# Patient Record
Sex: MALE | Race: BLACK OR AFRICAN AMERICAN | NOT HISPANIC OR LATINO | Employment: OTHER | URBAN - METROPOLITAN AREA
[De-identification: names, ages, dates, MRNs, and addresses within clinical notes are randomized per-mention and may not be internally consistent; named-entity substitution may affect disease eponyms.]

---

## 2017-06-29 ENCOUNTER — TELEPHONE (OUTPATIENT)
Dept: FAMILY MEDICINE | Facility: CLINIC | Age: 67
End: 2017-06-29

## 2017-06-29 NOTE — TELEPHONE ENCOUNTER
----- Message from Lisa Steven sent at 6/29/2017  9:44 AM CDT -----  Contact: Pt's wife Jennyfer Ann  Pt's wife, Jennyfer, would like to be called back regarding an appt. for a two week f/u from 06/30.          Please call pt at [phone](home) or [phone](cell).        Thanks!

## 2017-06-29 NOTE — TELEPHONE ENCOUNTER
"Spoke with pt wife Jennyfer.  Pt is hospitalized at this time at [facility] since 6/20/17, expecting "maybe" to be d/c 6/29. States that his glucose has been over 500mg/dL, and he has been having HTN.  Instructed wife to talk to [facility] staff and inquire if hospital has a d/c clinic that follows pt for 30 days after d/c. Verbalized understanding, will call back when she has had a chance to talk to staff.  Provided wife with POD 6 fax number to have medical records sent to us as soon as possible. Verbalized understanding.       Has appt with Dr. Hudson 8/23/17 (scheduled in May to establish care). Pt has been placed on wait list for sooner appt.       "

## 2017-08-23 ENCOUNTER — TELEPHONE (OUTPATIENT)
Dept: FAMILY MEDICINE | Facility: CLINIC | Age: 67
End: 2017-08-23

## 2017-08-23 NOTE — TELEPHONE ENCOUNTER
----- Message from Julien Tellez sent at 8/23/2017  2:58 PM CDT -----  Contact: Wife, Jennyfer Burger want to speak with a nurse regarding scheduling patient appointment for today, Jennyfer said she never cancelled patient appointment please call back at [phone]

## 2017-08-23 NOTE — TELEPHONE ENCOUNTER
Patient didn't know if the insurance would cover and then the patient wasn't even out of the hospital by then , rescheduled the patient

## 2017-09-26 ENCOUNTER — TELEPHONE (OUTPATIENT)
Dept: FAMILY MEDICINE | Facility: CLINIC | Age: 67
End: 2017-09-26

## 2017-09-26 ENCOUNTER — OUTPATIENT CASE MANAGEMENT (OUTPATIENT)
Dept: ADMINISTRATIVE | Facility: OTHER | Age: 67
End: 2017-09-26

## 2017-09-26 ENCOUNTER — LAB VISIT (OUTPATIENT)
Dept: LAB | Facility: HOSPITAL | Age: 67
End: 2017-09-26
Attending: INTERNAL MEDICINE

## 2017-09-26 ENCOUNTER — OFFICE VISIT (OUTPATIENT)
Dept: FAMILY MEDICINE | Facility: CLINIC | Age: 67
End: 2017-09-26
Payer: MEDICARE

## 2017-09-26 ENCOUNTER — CLINICAL SUPPORT (OUTPATIENT)
Dept: AUDIOLOGY | Facility: CLINIC | Age: 67
End: 2017-09-26

## 2017-09-26 VITALS
RESPIRATION RATE: 19 BRPM | SYSTOLIC BLOOD PRESSURE: 128 MMHG | DIASTOLIC BLOOD PRESSURE: 82 MMHG | HEART RATE: 68 BPM | WEIGHT: 216.25 LBS | OXYGEN SATURATION: 97 %

## 2017-09-26 DIAGNOSIS — I63.9 CEREBROVASCULAR ACCIDENT (CVA), UNSPECIFIED MECHANISM: ICD-10-CM

## 2017-09-26 DIAGNOSIS — Z11.59 NEED FOR HEPATITIS C SCREENING TEST: ICD-10-CM

## 2017-09-26 DIAGNOSIS — Z12.11 SCREEN FOR COLON CANCER: ICD-10-CM

## 2017-09-26 DIAGNOSIS — E11.42 DM TYPE 2 WITH DIABETIC PERIPHERAL NEUROPATHY: ICD-10-CM

## 2017-09-26 DIAGNOSIS — H91.90 DECREASED HEARING, UNSPECIFIED LATERALITY: ICD-10-CM

## 2017-09-26 DIAGNOSIS — H90.6 MIXED HEARING LOSS, BILATERAL: Primary | ICD-10-CM

## 2017-09-26 DIAGNOSIS — I10 ESSENTIAL HYPERTENSION: ICD-10-CM

## 2017-09-26 DIAGNOSIS — R60.9 EDEMA, UNSPECIFIED TYPE: ICD-10-CM

## 2017-09-26 DIAGNOSIS — E78.5 DYSLIPIDEMIA: ICD-10-CM

## 2017-09-26 LAB
ALBUMIN SERPL BCP-MCNC: 3.9 G/DL
ALP SERPL-CCNC: 73 U/L
ALT SERPL W/O P-5'-P-CCNC: 22 U/L
ANION GAP SERPL CALC-SCNC: 9 MMOL/L
AST SERPL-CCNC: 29 U/L
BILIRUB SERPL-MCNC: 0.9 MG/DL
BUN SERPL-MCNC: 13 MG/DL
CALCIUM SERPL-MCNC: 9.8 MG/DL
CHLORIDE SERPL-SCNC: 101 MMOL/L
CHOLEST SERPL-MCNC: 151 MG/DL
CHOLEST/HDLC SERPL: 4.7 {RATIO}
CO2 SERPL-SCNC: 29 MMOL/L
CREAT SERPL-MCNC: 1.2 MG/DL
EST. GFR  (AFRICAN AMERICAN): >60 ML/MIN/1.73 M^2
EST. GFR  (NON AFRICAN AMERICAN): >60 ML/MIN/1.73 M^2
ESTIMATED AVG GLUCOSE: 183 MG/DL
GLUCOSE SERPL-MCNC: 106 MG/DL
HBA1C MFR BLD HPLC: 8 %
HDLC SERPL-MCNC: 32 MG/DL
HDLC SERPL: 21.2 %
LDLC SERPL CALC-MCNC: 87.2 MG/DL
NONHDLC SERPL-MCNC: 119 MG/DL
POTASSIUM SERPL-SCNC: 4.3 MMOL/L
PROT SERPL-MCNC: 8.2 G/DL
SODIUM SERPL-SCNC: 139 MMOL/L
TRIGL SERPL-MCNC: 159 MG/DL

## 2017-09-26 PROCEDURE — 99204 OFFICE O/P NEW MOD 45 MIN: CPT | Mod: S$GLB,,, | Performed by: INTERNAL MEDICINE

## 2017-09-26 PROCEDURE — 92557 COMPREHENSIVE HEARING TEST: CPT | Mod: PBBFAC,PO | Performed by: AUDIOLOGIST-HEARING AID FITTER

## 2017-09-26 PROCEDURE — 80061 LIPID PANEL: CPT

## 2017-09-26 PROCEDURE — 1159F MED LIST DOCD IN RCRD: CPT | Mod: S$GLB,,, | Performed by: INTERNAL MEDICINE

## 2017-09-26 PROCEDURE — 86803 HEPATITIS C AB TEST: CPT

## 2017-09-26 PROCEDURE — 80053 COMPREHEN METABOLIC PANEL: CPT

## 2017-09-26 PROCEDURE — 1126F AMNT PAIN NOTED NONE PRSNT: CPT | Mod: S$GLB,,, | Performed by: INTERNAL MEDICINE

## 2017-09-26 PROCEDURE — 92567 TYMPANOMETRY: CPT | Mod: PBBFAC,PO | Performed by: AUDIOLOGIST-HEARING AID FITTER

## 2017-09-26 PROCEDURE — 83036 HEMOGLOBIN GLYCOSYLATED A1C: CPT

## 2017-09-26 PROCEDURE — 36415 COLL VENOUS BLD VENIPUNCTURE: CPT | Mod: PO

## 2017-09-26 PROCEDURE — 99999 PR PBB SHADOW E&M-EST. PATIENT-LVL IV: CPT | Mod: PBBFAC,,, | Performed by: INTERNAL MEDICINE

## 2017-09-26 RX ORDER — CLOPIDOGREL BISULFATE 75 MG/1
75 TABLET ORAL DAILY
COMMUNITY
Start: 2016-09-07 | End: 2017-10-06 | Stop reason: SDUPTHER

## 2017-09-26 RX ORDER — LISINOPRIL 20 MG/1
1 TABLET ORAL DAILY
COMMUNITY
Start: 2017-07-24 | End: 2017-10-06 | Stop reason: SDUPTHER

## 2017-09-26 RX ORDER — LANCETS
1 EACH MISCELLANEOUS
COMMUNITY
Start: 2016-11-02 | End: 2018-10-12 | Stop reason: SDUPTHER

## 2017-09-26 RX ORDER — AMLODIPINE BESYLATE 10 MG/1
10 TABLET ORAL DAILY
COMMUNITY
End: 2017-10-06 | Stop reason: SDUPTHER

## 2017-09-26 RX ORDER — ATORVASTATIN CALCIUM 10 MG/1
10 TABLET, FILM COATED ORAL DAILY
COMMUNITY
End: 2018-10-12 | Stop reason: SDUPTHER

## 2017-09-26 RX ORDER — INSULIN LISPRO 100 [IU]/ML
15 INJECTION, SOLUTION INTRAVENOUS; SUBCUTANEOUS
COMMUNITY
Start: 2017-06-29 | End: 2017-09-26

## 2017-09-26 NOTE — Clinical Note
Thank you for your referral to audiology. Results from today's mya can be found in his chart. 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 His left ear results indicate a profound hearing loss. Scheduled a follow up with ENT on 10/4/17 to discuss medical management of the TM perf and the air/bone gap. Recommended right ear, CROS or BICROS hearing aids for which he was given information to call the audiology clinic if he wishes to discuss hearing aids.

## 2017-09-26 NOTE — PROGRESS NOTES
Thank you for the referral.  Patient has been assigned to IRMA [facility],  for low risk screening for Outpatient Case Management.     Reason for referral: Low risk    Uncontrolled type 2 diabetes mellitus without complication, without long-term current use of insulin  DM type 2 with diabetic peripheral neuropathy  Essential hypertension  Dyslipidemia  Decreased hearing, unspecified laterality  Cerebrovascular accident (CVA), unspecified mechanism    Thank you,    Fabi Kumar, SSC

## 2017-09-26 NOTE — PROGRESS NOTES
Figueroa Ann was seen 09/26/2017 for an audiological evaluation. Patient complains of hearing loss. Pt reports he has had trouble with hearing for some time. He has uncontrolled diabetes type 2. He denies tinnitus and family Hx of hearing loss. 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     Results reveal a mild-to-profound mixed hearing loss for the right ear, and  severe-to-profound to profound mixed hearing loss for the left ear.    Speech Reception Thresholds were  30 dBHL for the right ear and 100 dBHL for the left ear.    Word recognition scores were fair for the right ear and CNT the left ear due to inability to test at 40 dB SPL.   Tympanograms were Type B large volume for the right ear and Type A for the left ear.    Audiogram results were reviewed in detail with patient and all questions were answered. Results will be reviewed by ENT at the completion of this note. Recommend further medical eval for the air/bone gap and asymmetrical hearing loss, right ear, CROS or BICROSamplification pending medical clearance, annual hearing tests to monitor hearing and hearing protection in loud noise. Scheduled pt with ENT, Dr. Christopher Moura for medical intervention on 10/4/17. Pt was also give information to call the clinic if he desires to consult about hearing aids.

## 2017-09-26 NOTE — PROGRESS NOTES
Subjective:       Patient ID: Figueroa Ann is a 67 y.o. male.    Chief Complaint: Establish Care    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  On Plavix.   DM - eye Dr in [place] due for eye exam.  Admitted June [place] for high blood sugar.  DC to rehab after.  A1c 13 June in [facility] Everywhere/[place].   HTN - controlled  HLD - on statin; gaol < 70 CVA  Complains of b/l LE swelling x 3 weeks.         Review of Systems   Constitutional: Negative for appetite change and fever.   HENT: Negative for nosebleeds and trouble swallowing.    Eyes: Negative for discharge and visual disturbance.   Respiratory: Negative for choking and shortness of breath.    Cardiovascular: Negative for chest pain and palpitations.   Gastrointestinal: Negative for abdominal pain, nausea and vomiting.   Musculoskeletal: Negative for arthralgias and joint swelling.   Skin: Negative for rash and wound.   Neurological: Negative for dizziness and syncope.   Psychiatric/Behavioral: Negative for confusion and dysphoric mood.       Objective:      Vitals:    09/26/17 0940   BP: 128/82   Pulse: 68   Resp: 19     Physical Exam   Constitutional: He appears well-nourished.   Eyes: Conjunctivae and EOM are normal.   Neck: Trachea normal and normal range of motion. No thyromegaly present.   Cardiovascular: Normal heart sounds.    Pulses:       Dorsalis pedis pulses are 1+ on the right side, and 1+ on the left side.   Edema negative   Pulmonary/Chest: Effort normal and breath sounds normal.   Abdominal: Soft. There is no hepatomegaly.   Feet:   Right Foot:   Protective Sensation: 4 sites tested. 0 sites sensed.   Left Foot:   Protective Sensation: 4 sites tested. 0 sites sensed.   Neurological: No cranial nerve deficit.   DTR decreased bilateral   Skin: Skin is warm, dry and intact.   Psychiatric: He has a normal mood and affect.   Alert and Oriented    Vitals reviewed.        Assessment:       1. Uncontrolled type 2 diabetes mellitus  without complication, without long-term current use of insulin    2. DM type 2 with diabetic peripheral neuropathy    3. Essential hypertension    4. Dyslipidemia    5. Decreased hearing, unspecified laterality    6. Cerebrovascular accident (CVA), unspecified mechanism    7. Need for hepatitis C screening test    8. Screen for colon cancer    9. Edema, unspecified type        Plan:       Uncontrolled type 2 diabetes mellitus without complication, without long-term current use of insulin  -     Microalbumin/creatinine urine ratio; Future; Expected date: 09/26/2017  -     Hemoglobin A1c; Future; Expected date: 09/26/2017  -     Hemoglobin A1c; Future; Expected date: 03/25/2018  -     Ambulatory referral to Outpatient Case Management  -     Ambulatory Referral to Diabetes Education    DM type 2 with diabetic peripheral neuropathy  -     Ambulatory consult to Podiatry  -     Ambulatory referral to Neurology  -     Ambulatory referral to Outpatient Case Management    Essential hypertension  -     Comprehensive metabolic panel; Future; Expected date: 03/25/2018  -     Comprehensive metabolic panel; Future; Expected date: 09/26/2017  -     Ambulatory referral to Outpatient Case Management    Dyslipidemia  -     Lipid panel; Future; Expected date: 09/26/2017  -     Lipid panel; Future; Expected date: 03/25/2018  -     Ambulatory referral to Outpatient Case Management    Decreased hearing, unspecified laterality  -     Ambulatory referral to Outpatient Case Management  -     Ambulatory consult to Audiology    Cerebrovascular accident (CVA), unspecified mechanism  -     CBC auto differential; Future; Expected date: 03/25/2018  -     Ambulatory referral to Neurology  -     Ambulatory referral to Outpatient Case Management    Need for hepatitis C screening test  -     Hepatitis C antibody; Future; Expected date: 09/26/2017    Screen for colon cancer  -     Case request GI: COLONOSCOPY    Edema, unspecified type  -     2D echo  "with color flow doppler; Future; Expected date: 09/26/2017  -      Lower Extremity Veins Bilateral; Future; Expected date: 09/26/2017            Counseled on regular exercise, maintenance of a healthy weight, balanced diet rich in fruits/vegetables and lean protein, and avoidance of unhealthy habits like smoking and excessive alcohol intake.   Also, counseled on importance of being compliant with medication, health appointments, diet and exercise.     Return in about 6 months (around 3/26/2018).    "This note will not be shared with the patient."  "

## 2017-09-26 NOTE — TELEPHONE ENCOUNTER
----- Message from Fabi Kumar sent at 9/26/2017 10:30 AM CDT -----  Thank you for the referral.  Patient has been assigned to IR[facility],  for low risk screening for Outpatient Case Management.      Reason for referral: Low risk     Uncontrolled type 2 diabetes mellitus without complication, without long-term current use of insulin  DM type 2 with diabetic peripheral neuropathy  Essential hypertension  Dyslipidemia  Decreased hearing, unspecified laterality  Cerebrovascular accident (CVA), unspecified mechanism     Thank you,     Fabi Kumar, SSC

## 2017-09-27 ENCOUNTER — OUTPATIENT CASE MANAGEMENT (OUTPATIENT)
Dept: ADMINISTRATIVE | Facility: OTHER | Age: 67
End: 2017-09-27

## 2017-09-27 LAB — HCV AB SERPL QL IA: NEGATIVE

## 2017-09-28 ENCOUNTER — OUTPATIENT CASE MANAGEMENT (OUTPATIENT)
Dept: ADMINISTRATIVE | Facility: OTHER | Age: 67
End: 2017-09-28

## 2017-09-28 ENCOUNTER — TELEPHONE (OUTPATIENT)
Dept: FAMILY MEDICINE | Facility: CLINIC | Age: 67
End: 2017-09-28

## 2017-09-28 NOTE — TELEPHONE ENCOUNTER
----- Message from IRMA Grubbs sent at 9/28/2017 11:48 AM CDT -----  This CSW received a referral on the above patient. I have attempted to contact the patient or caregiver by phone two times unsuccessfully and mailed a letter with our contact information.    Thank you for the referral,    IRMA Harris

## 2017-09-28 NOTE — PROGRESS NOTES
This CSW attempted to reach patient to provide resource . CSW unable to leave a message . Mail box has not been set up.  Referral source notified.  Letter with contact information was sent via US Mail to patient.

## 2017-09-28 NOTE — LETTER
September 28, 2017    Figueroa Ann  [address] [address]             [facility] [address] [place] [address] Dear:Mr. Figueroa Ann    I am writing from the Outpatient Complex Care Management Department at [facility].  I received a referral from Dr. Joel Hudson to contact you or your caregiver regarding any needs you may have. I have attempted to contact you or your cargeiver by phone two times unsuccessfully.  Please contact the Outpatient Complex Care Management Department at [phone] if you would like to discuss your needs.      Sincerely,         IRMA Harris

## 2017-10-02 ENCOUNTER — DOCUMENTATION ONLY (OUTPATIENT)
Dept: FAMILY MEDICINE | Facility: CLINIC | Age: 67
End: 2017-10-02

## 2017-10-04 ENCOUNTER — OFFICE VISIT (OUTPATIENT)
Dept: PODIATRY | Facility: CLINIC | Age: 67
End: 2017-10-04
Payer: MEDICARE

## 2017-10-04 ENCOUNTER — OFFICE VISIT (OUTPATIENT)
Dept: OTOLARYNGOLOGY | Facility: CLINIC | Age: 67
End: 2017-10-04
Payer: MEDICARE

## 2017-10-04 VITALS
SYSTOLIC BLOOD PRESSURE: 136 MMHG | HEIGHT: 67 IN | WEIGHT: 215.81 LBS | HEIGHT: 67 IN | BODY MASS INDEX: 33.84 KG/M2 | DIASTOLIC BLOOD PRESSURE: 82 MMHG | WEIGHT: 215.63 LBS | BODY MASS INDEX: 33.87 KG/M2

## 2017-10-04 DIAGNOSIS — H91.92 PROFOUND HEARING LOSS OF LEFT EAR: ICD-10-CM

## 2017-10-04 DIAGNOSIS — E11.9 ENCOUNTER FOR COMPREHENSIVE DIABETIC FOOT EXAMINATION, TYPE 2 DIABETES MELLITUS: Primary | ICD-10-CM

## 2017-10-04 DIAGNOSIS — R60.0 BILATERAL LOWER EXTREMITY EDEMA: ICD-10-CM

## 2017-10-04 DIAGNOSIS — H72.91 PERFORATION OF RIGHT TYMPANIC MEMBRANE: ICD-10-CM

## 2017-10-04 DIAGNOSIS — H90.A31 MIXED CONDUCTIVE AND SENSORINEURAL HEARING LOSS OF RIGHT EAR WITH RESTRICTED HEARING OF LEFT EAR: ICD-10-CM

## 2017-10-04 DIAGNOSIS — B35.1 ONYCHOMYCOSIS DUE TO DERMATOPHYTE: ICD-10-CM

## 2017-10-04 PROCEDURE — 99203 OFFICE O/P NEW LOW 30 MIN: CPT | Mod: S$GLB,,, | Performed by: OTOLARYNGOLOGY

## 2017-10-04 PROCEDURE — 99999 PR PBB SHADOW E&M-EST. PATIENT-LVL II: CPT | Mod: PBBFAC,,, | Performed by: OTOLARYNGOLOGY

## 2017-10-04 PROCEDURE — 99202 OFFICE O/P NEW SF 15 MIN: CPT | Mod: S$GLB,,, | Performed by: PODIATRIST

## 2017-10-04 PROCEDURE — 99999 PR PBB SHADOW E&M-EST. PATIENT-LVL II: CPT | Mod: PBBFAC,,, | Performed by: PODIATRIST

## 2017-10-04 NOTE — PROGRESS NOTES
"Subjective:      Patient ID: Figueroa Ann is a 67 y.o. male.    Chief Complaint: Diabetic Foot Exam (PCP:  Dr Hudson  9/26/17; HgbA1c: 9/26/17  8.0) and Foot Problem ("No feeling in feet")    Figueroa is a 67 y.o. male who presents to the clinic upon referral from Dr. Hudson  for evaluation and treatment of diabetic feet. Figueroa has a past medical history of Diabetes mellitus, type 2; DM (diabetes mellitus); HLD (hyperlipidemia); HTN (hypertension); and Stroke. Patient relates no major problem with feet. Only complaints today consist of long thick fungal nails difficult to cut. Reports that he was unable to feel the monofilament when with his PCP.    PCP: Joel Hudson MD    Date Last Seen by PCP: 9/26/17    Current shoe gear: Tennis shoes    Hemoglobin A1C   Date Value Ref Range Status   09/26/2017 8.0 (H) 4.0 - 5.6 % Final     Comment:     According to ADA guidelines, hemoglobin A1c <7.0% represents  optimal control in non-pregnant diabetic patients. Different  metrics may apply to specific patient populations.   Standards of Medical Care in Diabetes-2016.  For the purpose of screening for the presence of diabetes:  <5.7%     Consistent with the absence of diabetes  5.7-6.4%  Consistent with increasing risk for diabetes   (prediabetes)  >or=6.5%  Consistent with diabetes  Currently, no consensus exists for use of hemoglobin A1c  for diagnosis of diabetes for children.  This Hemoglobin A1c assay has significant interference with fetal   hemoglobin   (HbF). The results are invalid for patients with abnormal amounts of   HbF,   including those with known Hereditary Persistence   of Fetal Hemoglobin. Heterozygous hemoglobin variants (HbAS, HbAC,   HbAD, HbAE, HbA2) do not significantly interfere with this assay;   however, presence of multiple variants in a sample may impact the %   interference.             Review of Systems   Constitution: Negative for chills, fever, weakness and malaise/fatigue.   Cardiovascular: Negative " for claudication and leg swelling.   Respiratory: Negative for shortness of breath.    Skin: Positive for nail changes. Negative for itching, poor wound healing and rash.   Musculoskeletal: Positive for joint pain. Negative for gout, muscle cramps, muscle weakness and myalgias.   Gastrointestinal: Negative for nausea and vomiting.   Neurological: Negative for focal weakness, loss of balance, numbness and paresthesias.           Objective:      Physical Exam   Constitutional: He is oriented to person, place, and time. He appears well-developed and well-nourished. No distress.   Cardiovascular:   Pulses:       Dorsalis pedis pulses are 2+ on the right side, and 2+ on the left side.        Posterior tibial pulses are 2+ on the right side, and 2+ on the left side.   < 3 sec capillary refill time to toes 1-5 bilateral. Toes and feet are warm to touch proximally with normal distal cooling b/l. There is no hair growth on the feet and toes b/l. There is 2+ pitting edema b/l. No obvious spider veins or varicosities present b/l.      Musculoskeletal:   Equinus noted b/l ankles with < 10 deg DF noted. MMT 5/5 in DF/PF/Inv/Ev resistance with no reproduction of pain in any direction. Passive range of motion of ankle and pedal joints is painless b/l.     Feet:   Right Foot:   Protective Sensation: 10 sites tested. 10 sites sensed.   Left Foot:   Protective Sensation: 10 sites tested. 10 sites sensed.   Neurological: He is alert and oriented to person, place, and time. He has normal strength. He displays no atrophy and no tremor. No sensory deficit. He exhibits normal muscle tone.   Negative tinel sign bilateral. Vibratory, sharp dull, and soft touch sensations intact bilateral.   Skin: Skin is warm, dry and intact. No abrasion, no bruising, no burn, no ecchymosis, no laceration, no lesion, no petechiae and no rash noted. He is not diaphoretic. No cyanosis or erythema. No pallor. Nails show no clubbing.   Skin temperature within  normal limits. Arie was thin and atrophic.     Nails 1-5 bilateral are thick 3-4 mm, long 3-6 mm, and discolored with subungual debris     Psychiatric: He has a normal mood and affect. His behavior is normal.             Assessment:       Encounter Diagnoses   Name Primary?    Encounter for comprehensive diabetic foot examination, type 2 diabetes mellitus Yes    Onychomycosis due to dermatophyte     Bilateral lower extremity edema          Plan:       Figueroa was seen today for diabetic foot exam and foot problem.    Diagnoses and all orders for this visit:    Encounter for comprehensive diabetic foot examination, type 2 diabetes mellitus    Onychomycosis due to dermatophyte    Bilateral lower extremity edema      I counseled the patient on his conditions, their implications and medical management.    Shoe inspection. Diabetic Foot Education. Patient reminded of the importance of good nutrition and blood sugar control to help prevent podiatric complications of diabetes. Patient instructed on proper foot hygeine. We discussed wearing proper shoe gear, daily foot inspections, never walking without protective shoe gear, never putting sharp instruments to feet    Discussed treatment options for fungal toenails to include topical vs oral vs laser vs combined therapy in detail however with diabetes there is a low chance of cure and high incidence of recurrence often times not worth treating, recommend debridement    He does not qualify for routine nail care, discussed proc B options. Patient defers at this time and would like to cut his own nails.    Recommend OTC compression stockings for edema, is following up with vascular testing with his PCP, will defer to PCP for edema management.    Return 1 year for diabetic foot check.    Cam Trivedi DPM

## 2017-10-04 NOTE — PROGRESS NOTES
Subjective:       Patient ID: Figueroa Ann is a 67 y.o. male.    Chief Complaint: perforated ear drum    Figueroa is here for hearing loss. Symptoms have been present for many years. No vertigo or otorrhea. Worse on the left. He denies pain. No family history of hearing loss. Denies hearing loss at a younger age. Denies trauma. He has a history of DM and hypertension. Has not worn hearing aids in the past    He reports an MRI of his Brain recently at [facility].    Previous surgery: None head or neck    History   Smoking Status    Current Some Day Smoker    Types: Cigarettes   Smokeless Tobacco    Never Used     History   Alcohol Use No          Review of Systems   Constitutional: Negative for activity change and appetite change.   Eyes: Negative for discharge.   Respiratory: Negative for difficulty breathing and wheezing   Cardiovascular: Negative for chest pain.   Gastrointestinal: Negative for abdominal distention and abdominal pain.   Endocrine: Negative for cold intolerance and heat intolerance.   Genitourinary: Negative for dysuria.   Musculoskeletal: Negative for gait problem and joint swelling.   Skin: Negative for color change and pallor.   Neurological: Negative for syncope and weakness.   Psychiatric/Behavioral: Negative for agitation and confusion.     Objective:        Constitutional:   He is oriented to person, place, and time. He appears well-developed and well-nourished. He appears alert. He is active. Normal speech.      Head:  Normocephalic and atraumatic. Head is without TMJ tenderness. No scars. Salivary glands normal.  Facial strength is normal.      Ears:    Right Ear: No drainage or swelling. Tympanic membrane is perforated (10% posterior perforation near malleus manubrium) and erythematous. No middle ear effusion. Decreased hearing is noted.   Left Ear: No drainage or swelling. Tympanic membrane is scarred (extensive tympanosclerosis).  No middle ear effusion. Decreased hearing is noted.    Dried cerumen on and adjacent to TM on right    Nose:  No mucosal edema, rhinorrhea or sinus tenderness. No turbinate hypertrophy.      Mouth/Throat  Oropharynx clear and moist without lesions or asymmetry, normal uvula midline and mirror exam normal. Normal dentition. No uvula swelling, lacerations or trismus. No oropharyngeal exudate. Tonsillar erythema, tonsillar exudate.      Neck:  Full range of motion with neck supple and no adenopathy. Thyroid tenderness is present. No tracheal deviation, no edema, no erythema, normal range of motion, no stridor, no crepitus and no neck rigidity present. No thyroid mass present.     Cardiovascular:   Intact distal pulses and normal pulses.      Pulmonary/Chest:   Effort normal and breath sounds normal. No stridor.     Psychiatric:   His speech is normal and behavior is normal. His mood appears not anxious. His affect is not labile.     Neurological:   He is alert and oriented to person, place, and time. No sensory deficit.     Skin:   No abrasions, lacerations, lesions, or rashes. No abrasion and no bruising noted.         Tests / Results:  I personally reviewed the audiogram from Sept 2017 and my findings reveal: severe to profound mixed HL AS with moderate to severe/profound mixed HL AD . WRS AD 76%             Assessment:       1. Perforation of right tympanic membrane    2. Profound hearing loss of left ear    3. Mixed conductive and sensorineural hearing loss of right ear with restricted hearing of left ear          Plan:         Mr Ann has bilateral hearing loss, currently with a non-serviceable left ear. I suspect he may have advanced cochlear otosclerosis based on his mixed HL in the severe to profound range. He denies any previous ear history. He does have a right sided tympanic membrane perforation which is possibly causing a conductive loss on that side, although it could also be ossicular pathology. His ear is dry and healthy on the right. It is amenable to  at least a hearing aid and more optimally a BiCROS.     I discussed tympanoplasty on the right as a possible option, but this would likely not improve his hearing enough to not need an aid on that side. It is also his only hearing ear. As a result, I intiially would recommend BiCROS or right aid evaluation. If he does not achieve benefit with aid, we could consider surgery at that time.     Follow-up with me as needed.

## 2017-10-04 NOTE — LETTER
October 4, 2017      Joel Hudson MD  [address] [facility] [address] [place] [address]           [facility]  [address] [facility] [address] [place] [address]  Phone: [phone]          Patient: Figueroa nAn   MR Number: [id number]   YOB: 1950   Date of Visit: 10/4/2017       Dear Dr. Joel Hudson:    Thank you for referring Figueroa Ann to me for evaluation. Attached you will find relevant portions of my assessment and plan of care.    If you have questions, please do not hesitate to call me. I look forward to following Figueroa Ann along with you.    Sincerely,    Cam Trivedi, DPWALT    Enclosure  CC:  No Recipients    If you would like to receive this communication electronically, please contact [email or web address] or [phone] to request more information on [facility] access.    For providers and/or their staff who would like to refer a patient to [facility], please contact us through our one-stop-shop provider referral line, Arminda Pena, at [phone].    If you feel you have received this communication in error or would no longer like to receive these types of communications, please e-mail [email or web address]

## 2017-10-06 ENCOUNTER — TELEPHONE (OUTPATIENT)
Dept: FAMILY MEDICINE | Facility: CLINIC | Age: 67
End: 2017-10-06

## 2017-10-06 RX ORDER — AMLODIPINE BESYLATE 10 MG/1
10 TABLET ORAL DAILY
Qty: 90 TABLET | Refills: 3 | Status: SHIPPED | OUTPATIENT
Start: 2017-10-06 | End: 2018-10-12 | Stop reason: SDUPTHER

## 2017-10-06 RX ORDER — LISINOPRIL 20 MG/1
20 TABLET ORAL DAILY
Qty: 90 TABLET | Refills: 3 | Status: SHIPPED | OUTPATIENT
Start: 2017-10-06 | End: 2018-10-12 | Stop reason: SDUPTHER

## 2017-10-06 RX ORDER — CLOPIDOGREL BISULFATE 75 MG/1
75 TABLET ORAL DAILY
Qty: 90 TABLET | Refills: 3 | Status: SHIPPED | OUTPATIENT
Start: 2017-10-06 | End: 2019-01-04 | Stop reason: SDUPTHER

## 2017-10-06 NOTE — TELEPHONE ENCOUNTER
Pravastatin not prescribed by this clinic.  If pt called back please ask who was the prescriber on the bottle or for them to call their pharmacy to request it from past prescriber. Thanks

## 2017-10-06 NOTE — PROGRESS NOTES
Refill Authorization Note     is requesting a refill authorization.    Brief assessment and rational for refill: APPROVE: prr  Name of medication: amlodipine 10 mg / lisinopril 20 mg / clopidogrel 75 mg   How patient will take medication: t1t po daily   Amount/Quantity of medication ordered: 90d   Medication reconciliation completed: No        Refills Authorized: Yes  If authorized number of refills: 3        Medication Therapy Plan: BP controlled.  last kidney labs WNL.  clopidogrel for hx of CVA.  Approve all for 12 mo.  Consider ordering CBC to monitor H/H with plavix.  No recent labwork or scanned in media found.   [employer] ([facility]) system is not working da; cannot search for pravastatin in this instance.  Comments:   Lab Results   Component Value Date    CREATININE 1.2 09/26/2017    BUN 13 09/26/2017     09/26/2017    K 4.3 09/26/2017     09/26/2017    CO2 29 09/26/2017      BP Readings from Last 3 Encounters:   10/04/17 136/82   09/26/17 128/82

## 2017-10-06 NOTE — TELEPHONE ENCOUNTER
Pharmacy also asking for refill on pravastatin but I don't see it in his med list or past med list.    L.O.V 9/26/17    F/u 3/26/18

## 2017-10-10 ENCOUNTER — OUTPATIENT CASE MANAGEMENT (OUTPATIENT)
Dept: ADMINISTRATIVE | Facility: OTHER | Age: 67
End: 2017-10-10

## 2017-10-10 NOTE — PROGRESS NOTES
CSW received a return call from patient spouse on recorder. CSW attempted to contact patient and spouse no answer. CSW unable to leave message.. Patient has a voicemail box that has not been set up.

## 2017-10-11 ENCOUNTER — CLINICAL SUPPORT (OUTPATIENT)
Dept: AUDIOLOGY | Facility: CLINIC | Age: 67
End: 2017-10-11
Payer: MEDICARE

## 2017-10-11 ENCOUNTER — TELEPHONE (OUTPATIENT)
Dept: OTOLARYNGOLOGY | Facility: CLINIC | Age: 67
End: 2017-10-11

## 2017-10-11 ENCOUNTER — PATIENT OUTREACH (OUTPATIENT)
Dept: ADMINISTRATIVE | Facility: HOSPITAL | Age: 67
End: 2017-10-11

## 2017-10-11 DIAGNOSIS — Z71.89 ENCOUNTER FOR HEARING AID CONSULTATION: Primary | ICD-10-CM

## 2017-10-11 NOTE — TELEPHONE ENCOUNTER
Reviewed notes: MRI Diffusion imaging showed no stroke. Not great for CPA lesion.    CT Head more recently negative for widening of IAC or concerning pathology.     Medically cleared for hearing aids. Would defer MRI due to medical co-morbidities and age at this time at hearing is not serviceable. Cleared for aids.

## 2017-10-11 NOTE — LETTER
October 11, 2017    Figueroa Ann  [address] [place] [id number]             [facility]  [address]  [facility] [address]  Phone: [phone] Dear Mr. Ann:    [facility] is committed to your overall health and would like to ensure that you are up to date on all your health maintenance testing.  Our records indicate that you are overdue for your  ANNUAL DILATED EYE EXAM.    If you have had this exam done at another facility, please let us know so we can update your record.  If you have a copy of these records, please provide a copy for us to scan into your chart, you are welcome to request that the report is faxed to the our fax ([phone]).  If not, please provide that provider/facilities contact information so that we may obtain copies from that facility.   If you have an upcoming eye exam appointment at another facility, please provide them our fax number so that they may fax the report to us.      Otherwise, please schedule this exam at your earliest convenience.    Also, you may be due for Immunizations and a Colonoscopy (Colorectal screening).       If you have any questions or concerns, please don't hesitate to call.    Sincerely,    Marie Olsen  Clinical Care Coordinator  [facility] [address] [place] [address]  Phone: [phone]   Fax: [phone]

## 2017-10-11 NOTE — PROGRESS NOTES
Figueroa Ann was seen on 10/11/2017 for a hearing aid consult with his wife. Patient's audiogram and hearing aid options were discussed in detail. Explained that the BICROS option would allow him to hear conversation and noiseDemoed Phonak BICROS aids and also the single right hearing aid. Pt said he heard much better with the BICROS option rather than the single right aid. We discussed the different sizes and levels of technology and decided based on the patients lifestyle that the best choice would be Phonak Audeo B?-13 in color P3 with size 1xP  AD and size 1 thin tube for the BICROS. The price was quoted for each tech level and both options (BICROS and single right aid). Pt was informed that the hearing test would be valid for 6 months for the purchase of hearing aids and that they would need to pay for the hearing aids in full and be reimbursed by their insurance company for any hearing aid benefits they may have. They were going to look into Care Credit and call [employer] to determine any hearing aid benefits. Pt will call if he decides to purchase the aids.

## 2018-03-02 DIAGNOSIS — Z13.5 DIABETIC RETINOPATHY SCREENING: ICD-10-CM

## 2018-03-12 ENCOUNTER — PATIENT OUTREACH (OUTPATIENT)
Dept: ADMINISTRATIVE | Facility: HOSPITAL | Age: 68
End: 2018-03-12

## 2018-03-12 NOTE — PROGRESS NOTES
Health Maintenance Due   Topic Date Due    Eye Exam  04/12/1958    TETANUS VACCINE  04/12/1966    Colonoscopy  04/12/1998    Zoster Vaccine  04/12/2008    Pneumococcal (65+) (1 of 2 - PCV13) 04/12/2013    Abdominal Aortic Aneurysm Screening  04/12/2013    Influenza Vaccine  08/01/2017    Hemoglobin A1c  12/26/2017     Pre-visit outreach via mail

## 2018-03-12 NOTE — LETTER
March 12, 2018    Figueroa Ann  [address] [place] [id number]             [facility]  [address]  [facility] [address]  Phone: [phone] Dear Mr. Ann:    [facility] is committed to your overall health.  To help you get the most out of each of your visits, we will review your information to make sure you are up to date on all of your recommended tests and/or procedures.      Dr. Hudson        has found that you may be due for:    Tetanus immunization  Colonoscopy (Colorectal screening)  Shingles immunization  Pneumonia immunization  Influenza vaccine  Diabetic Retinopathy EYE EXAM screening (we now are able to perform this test the same day as your office visit).  You can have this test right after your appointment with Dr. Hudson on 3/26/18  without having to pay another co-pay.  If you recently had this Eye Exam outside of [facility], please bring a copy of this report so that we may scan the report into your chart.    REMINDER: lab appointment 3/19/18    If you have had any of the above done at another facility, please bring the records or information with you so that your record at [facility] will be complete.     Sincerely,    Marie Olsen  Clinical Care Coordinator  [facility] [address] [place] [address]  Phone: [phone]   Fax: [phone]

## 2018-10-12 ENCOUNTER — OFFICE VISIT (OUTPATIENT)
Dept: FAMILY MEDICINE | Facility: CLINIC | Age: 68
End: 2018-10-12
Payer: MEDICARE

## 2018-10-12 ENCOUNTER — CLINICAL SUPPORT (OUTPATIENT)
Dept: FAMILY MEDICINE | Facility: CLINIC | Age: 68
End: 2018-10-12
Attending: FAMILY MEDICINE
Payer: MEDICARE

## 2018-10-12 VITALS
HEART RATE: 48 BPM | WEIGHT: 211.19 LBS | HEIGHT: 67 IN | RESPIRATION RATE: 18 BRPM | OXYGEN SATURATION: 99 % | BODY MASS INDEX: 33.15 KG/M2 | DIASTOLIC BLOOD PRESSURE: 88 MMHG | SYSTOLIC BLOOD PRESSURE: 138 MMHG

## 2018-10-12 DIAGNOSIS — I10 ESSENTIAL HYPERTENSION: ICD-10-CM

## 2018-10-12 DIAGNOSIS — E78.5 DYSLIPIDEMIA: ICD-10-CM

## 2018-10-12 PROCEDURE — 99214 OFFICE O/P EST MOD 30 MIN: CPT | Mod: S$PBB,,, | Performed by: FAMILY MEDICINE

## 2018-10-12 PROCEDURE — 90670 PCV13 VACCINE IM: CPT | Mod: PBBFAC,PO

## 2018-10-12 PROCEDURE — 3075F SYST BP GE 130 - 139MM HG: CPT | Mod: ,,, | Performed by: FAMILY MEDICINE

## 2018-10-12 PROCEDURE — 99999 PR PBB SHADOW E&M-EST. PATIENT-LVL III: CPT | Mod: PBBFAC,,, | Performed by: FAMILY MEDICINE

## 2018-10-12 PROCEDURE — 1101F PT FALLS ASSESS-DOCD LE1/YR: CPT | Mod: ,,, | Performed by: FAMILY MEDICINE

## 2018-10-12 PROCEDURE — 3079F DIAST BP 80-89 MM HG: CPT | Mod: ,,, | Performed by: FAMILY MEDICINE

## 2018-10-12 PROCEDURE — 99213 OFFICE O/P EST LOW 20 MIN: CPT | Mod: PBBFAC,PO,25 | Performed by: FAMILY MEDICINE

## 2018-10-12 PROCEDURE — 90662 IIV NO PRSV INCREASED AG IM: CPT | Mod: PBBFAC,PO

## 2018-10-12 RX ORDER — LANCETS
1 EACH MISCELLANEOUS
Qty: 300 EACH | Refills: 3 | Status: SHIPPED | OUTPATIENT
Start: 2018-10-12

## 2018-10-12 RX ORDER — ATORVASTATIN CALCIUM 10 MG/1
10 TABLET, FILM COATED ORAL DAILY
Qty: 90 TABLET | Refills: 3 | Status: SHIPPED | OUTPATIENT
Start: 2018-10-12 | End: 2019-10-18 | Stop reason: SDUPTHER

## 2018-10-12 RX ORDER — LISINOPRIL 20 MG/1
20 TABLET ORAL DAILY
Qty: 90 TABLET | Refills: 3 | Status: SHIPPED | OUTPATIENT
Start: 2018-10-12 | End: 2019-10-18 | Stop reason: SDUPTHER

## 2018-10-12 RX ORDER — AMLODIPINE BESYLATE 10 MG/1
10 TABLET ORAL DAILY
Qty: 90 TABLET | Refills: 3 | Status: SHIPPED | OUTPATIENT
Start: 2018-10-12 | End: 2019-10-18 | Stop reason: SDUPTHER

## 2018-10-12 NOTE — PROGRESS NOTES
Subjective:       Patient ID: Figueroa Ann is a 68 y.o. male.    Chief Complaint: Diabetes (f/u)    HPI       Here for a f/u.    Here with wife.      Type 2 dm:  From recall  Fastin-150  On nph insulin.   a1c 8% on 17    htn controlled.     CVA - x3 with last being in Oct 2016.  On Plavix.       Review of Systems      Review of Systems   Constitutional: Negative for fever and chills.   HENT: Negative for hearing loss and neck stiffness.    Eyes: Negative for redness and itching.   Respiratory: Negative for cough and choking.    Cardiovascular: Negative for chest pain and leg swelling.  Abdomen: Negative for abdominal pain and blood in stool.   Genitourinary: Negative for dysuria and flank pain.   Musculoskeletal: Negative for back pain and gait problem.   Neurological: Negative for light-headedness and headaches.   Hematological: Negative for adenopathy.   Psychiatric/Behavioral: Negative for behavioral problems.     Objective:      Physical Exam   HENT:   Head: Atraumatic.   Eyes: Conjunctivae are normal. Pupils are equal, round, and reactive to light.   Neck: Normal range of motion.   Cardiovascular: Normal rate and regular rhythm.   No murmur heard.  Pulmonary/Chest: Effort normal and breath sounds normal. He has no wheezes.   Lymphadenopathy:     He has no cervical adenopathy.       Protective Sensation (w/ 10 gram monofilament):  Right: Intact  Left: Intact    Visual Inspection:  Normal -  Bilateral    Pedal Pulses:   Right: Present  Left: Present    Posterior tibialis:   Right:Present  Left: Present          Assessment:       1. Uncontrolled type 2 diabetes mellitus without complication, without long-term current use of insulin    2. Essential hypertension    3. Dyslipidemia        Plan:       Uncontrolled type 2 diabetes mellitus without complication, without long-term current use of insulin  -     Diabetic Eye Screening Photo; Future  -     Hemoglobin A1c; Future; Expected date: 10/12/2018  -      Lipid panel; Future; Expected date: 10/12/2018  -     Comprehensive metabolic panel; Future; Expected date: 10/12/2018  -     Microalbumin/creatinine urine ratio; Future; Expected date: 10/12/2018  -     Ambulatory referral to Podiatry    Essential hypertension    Dyslipidemia    Other orders  -     insulin NPH (NOVOLIN N) 100 unit/mL injection; Inject 25 Units into the skin once daily. TID with sliding scale. Hold is less than 180  Dispense: 30 mL; Refill: 3  -     amLODIPine (NORVASC) 10 MG tablet; Take 1 tablet (10 mg total) by mouth once daily.  Dispense: 90 tablet; Refill: 3  -     atorvastatin (LIPITOR) 10 MG tablet; Take 1 tablet (10 mg total) by mouth once daily.  Dispense: 90 tablet; Refill: 3  -     blood sugar diagnostic Strp; 1 strip by Misc.(Non-Drug; Combo Route) route 3 (three) times daily before meals.  Dispense: 300 strip; Refill: 3  -     lancets Misc; 1 lancet by Misc.(Non-Drug; Combo Route) route 3 (three) times daily before meals.  Dispense: 300 each; Refill: 3  -     lisinopril (PRINIVIL,ZESTRIL) 20 MG tablet; Take 1 tablet (20 mg total) by mouth once daily.  Dispense: 90 tablet; Refill: 3  -     (In Office Administered) Pneumococcal Conjugate Vaccine (13 Valent) (IM)  -     Influenza - High Dose (65+) (PF) (IM)              Plan:  See orders  Cont current meds         Medication List           Accurate as of 10/12/18 11:59 PM. If you have any questions, ask your nurse or doctor.               CHANGE how you take these medications    blood sugar diagnostic Strp  1 strip by Misc.(Non-Drug; Combo Route) route 3 (three) times daily before meals.  What changed:  how to take this  Changed by:  Lit Albarado MD     lancets Misc  1 lancet by Misc.(Non-Drug; Combo Route) route 3 (three) times daily before meals.  What changed:  how to take this  Changed by:  Lit Albarado MD        CONTINUE taking these medications    amLODIPine 10 MG tablet  Commonly known as:  NORVASC  Take 1 tablet (10 mg  total) by mouth once daily.     atorvastatin 10 MG tablet  Commonly known as:  LIPITOR  Take 1 tablet (10 mg total) by mouth once daily.     clopidogrel 75 mg tablet  Commonly known as:  PLAVIX  Take 1 tablet (75 mg total) by mouth once daily.     insulin  unit/mL injection  Commonly known as:  NovoLIN N  Inject 25 Units into the skin once daily. TID with sliding scale. Hold is less than 180     lisinopril 20 MG tablet  Commonly known as:  PRINIVIL,ZESTRIL  Take 1 tablet (20 mg total) by mouth once daily.           Where to Get Your Medications      These medications were sent to [facility] [address] [address]    Phone:  [phone]   · amLODIPine 10 MG tablet  · atorvastatin 10 MG tablet  · blood sugar diagnostic Strp  · insulin  unit/mL injection  · lancets Misc  · lisinopril 20 MG tablet

## 2018-10-22 ENCOUNTER — OFFICE VISIT (OUTPATIENT)
Dept: PODIATRY | Facility: CLINIC | Age: 68
End: 2018-10-22
Payer: MEDICARE

## 2018-10-22 ENCOUNTER — LAB VISIT (OUTPATIENT)
Dept: LAB | Facility: HOSPITAL | Age: 68
End: 2018-10-22
Attending: INTERNAL MEDICINE
Payer: MEDICARE

## 2018-10-22 ENCOUNTER — NURSE TRIAGE (OUTPATIENT)
Dept: ADMINISTRATIVE | Facility: CLINIC | Age: 68
End: 2018-10-22

## 2018-10-22 VITALS
SYSTOLIC BLOOD PRESSURE: 190 MMHG | WEIGHT: 211.19 LBS | BODY MASS INDEX: 33.15 KG/M2 | HEIGHT: 67 IN | DIASTOLIC BLOOD PRESSURE: 106 MMHG | HEART RATE: 45 BPM

## 2018-10-22 DIAGNOSIS — B35.1 ONYCHOMYCOSIS DUE TO DERMATOPHYTE: ICD-10-CM

## 2018-10-22 DIAGNOSIS — E11.51 TYPE II DIABETES MELLITUS WITH PERIPHERAL CIRCULATORY DISORDER: Primary | ICD-10-CM

## 2018-10-22 DIAGNOSIS — I10 ESSENTIAL HYPERTENSION: ICD-10-CM

## 2018-10-22 DIAGNOSIS — R60.0 BILATERAL LOWER EXTREMITY EDEMA: ICD-10-CM

## 2018-10-22 DIAGNOSIS — E11.49 TYPE II DIABETES MELLITUS WITH NEUROLOGICAL MANIFESTATIONS: ICD-10-CM

## 2018-10-22 DIAGNOSIS — I63.9 CEREBROVASCULAR ACCIDENT (CVA), UNSPECIFIED MECHANISM: ICD-10-CM

## 2018-10-22 LAB
ALBUMIN SERPL BCP-MCNC: 3.9 G/DL
ALP SERPL-CCNC: 67 U/L
ALT SERPL W/O P-5'-P-CCNC: 18 U/L
ANION GAP SERPL CALC-SCNC: 8 MMOL/L
AST SERPL-CCNC: 27 U/L
BASOPHILS # BLD AUTO: 0.02 K/UL
BASOPHILS NFR BLD: 0.3 %
BILIRUB SERPL-MCNC: 1 MG/DL
BUN SERPL-MCNC: 17 MG/DL
CALCIUM SERPL-MCNC: 9.8 MG/DL
CHLORIDE SERPL-SCNC: 103 MMOL/L
CO2 SERPL-SCNC: 26 MMOL/L
CREAT SERPL-MCNC: 1.3 MG/DL
DIFFERENTIAL METHOD: ABNORMAL
EOSINOPHIL # BLD AUTO: 0.2 K/UL
EOSINOPHIL NFR BLD: 2 %
ERYTHROCYTE [DISTWIDTH] IN BLOOD BY AUTOMATED COUNT: 14.5 %
EST. GFR  (AFRICAN AMERICAN): >60 ML/MIN/1.73 M^2
EST. GFR  (NON AFRICAN AMERICAN): 55.3 ML/MIN/1.73 M^2
GLUCOSE SERPL-MCNC: 106 MG/DL
HCT VFR BLD AUTO: 44.3 %
HGB BLD-MCNC: 14 G/DL
IMM GRANULOCYTES # BLD AUTO: 0.02 K/UL
IMM GRANULOCYTES NFR BLD AUTO: 0.3 %
LYMPHOCYTES # BLD AUTO: 2.1 K/UL
LYMPHOCYTES NFR BLD: 27.9 %
MCH RBC QN AUTO: 28.3 PG
MCHC RBC AUTO-ENTMCNC: 31.6 G/DL
MCV RBC AUTO: 90 FL
MONOCYTES # BLD AUTO: 0.7 K/UL
MONOCYTES NFR BLD: 9.5 %
NEUTROPHILS # BLD AUTO: 4.4 K/UL
NEUTROPHILS NFR BLD: 60 %
NRBC BLD-RTO: 0 /100 WBC
PLATELET # BLD AUTO: 249 K/UL
PMV BLD AUTO: 10.9 FL
POTASSIUM SERPL-SCNC: 4 MMOL/L
PROT SERPL-MCNC: 8 G/DL
RBC # BLD AUTO: 4.94 M/UL
SODIUM SERPL-SCNC: 137 MMOL/L
WBC # BLD AUTO: 7.38 K/UL

## 2018-10-22 PROCEDURE — 11721 DEBRIDE NAIL 6 OR MORE: CPT | Mod: Q9,S$PBB,, | Performed by: PODIATRIST

## 2018-10-22 PROCEDURE — 99999 PR PBB SHADOW E&M-EST. PATIENT-LVL III: CPT | Mod: PBBFAC,,, | Performed by: PODIATRIST

## 2018-10-22 PROCEDURE — 80053 COMPREHEN METABOLIC PANEL: CPT

## 2018-10-22 PROCEDURE — 3080F DIAST BP >= 90 MM HG: CPT | Mod: ,,, | Performed by: PODIATRIST

## 2018-10-22 PROCEDURE — 11721 DEBRIDE NAIL 6 OR MORE: CPT | Mod: Q9,PBBFAC,PN | Performed by: PODIATRIST

## 2018-10-22 PROCEDURE — 85025 COMPLETE CBC W/AUTO DIFF WBC: CPT

## 2018-10-22 PROCEDURE — 99213 OFFICE O/P EST LOW 20 MIN: CPT | Mod: 25,S$PBB,, | Performed by: PODIATRIST

## 2018-10-22 PROCEDURE — 1101F PT FALLS ASSESS-DOCD LE1/YR: CPT | Mod: ,,, | Performed by: PODIATRIST

## 2018-10-22 PROCEDURE — 99213 OFFICE O/P EST LOW 20 MIN: CPT | Mod: PBBFAC,PN | Performed by: PODIATRIST

## 2018-10-22 PROCEDURE — 36415 COLL VENOUS BLD VENIPUNCTURE: CPT | Mod: PO

## 2018-10-22 PROCEDURE — 3077F SYST BP >= 140 MM HG: CPT | Mod: ,,, | Performed by: PODIATRIST

## 2018-10-22 NOTE — PROGRESS NOTES
Subjective:      Patient ID: Figueroa Ann is a 68 y.o. male.    Chief Complaint: Diabetic Foot Exam (PCP-()-10/12/2018) and Diabetes Mellitus (A1c-8.0-09/26/2017)    Figueroa is a 68 y.o. male who presents to the clinic upon referral from Dr. Albarado  for evaluation and treatment of diabetic feet. Figueroa has a past medical history of Diabetes mellitus, type 2, DM (diabetes mellitus), HLD (hyperlipidemia), HTN (hypertension), and Stroke. Patient relates no major problem with feet. Only complaints today consist of long thick fungal nails difficult to cut, he has DM with peripheral vascular disease. No new pedal complaints.    PCP: Joel Hudson MD    Date Last Seen by PCP: 9/26/17    Current shoe gear: Tennis shoes    Hemoglobin A1C   Date Value Ref Range Status   09/26/2017 8.0 (H) 4.0 - 5.6 % Final     Comment:     According to ADA guidelines, hemoglobin A1c <7.0% represents  optimal control in non-pregnant diabetic patients. Different  metrics may apply to specific patient populations.   Standards of Medical Care in Diabetes-2016.  For the purpose of screening for the presence of diabetes:  <5.7%     Consistent with the absence of diabetes  5.7-6.4%  Consistent with increasing risk for diabetes   (prediabetes)  >or=6.5%  Consistent with diabetes  Currently, no consensus exists for use of hemoglobin A1c  for diagnosis of diabetes for children.  This Hemoglobin A1c assay has significant interference with fetal   hemoglobin   (HbF). The results are invalid for patients with abnormal amounts of   HbF,   including those with known Hereditary Persistence   of Fetal Hemoglobin. Heterozygous hemoglobin variants (HbAS, HbAC,   HbAD, HbAE, HbA2) do not significantly interfere with this assay;   however, presence of multiple variants in a sample may impact the %   interference.             Review of Systems   Constitution: Negative for chills, fever, weakness and malaise/fatigue.   Cardiovascular: Negative for  claudication and leg swelling.   Respiratory: Negative for shortness of breath.    Skin: Positive for nail changes. Negative for itching, poor wound healing and rash.   Musculoskeletal: Positive for joint pain. Negative for gout, muscle cramps, muscle weakness and myalgias.   Gastrointestinal: Negative for nausea and vomiting.   Neurological: Negative for focal weakness, loss of balance, numbness and paresthesias.           Objective:      Physical Exam   Constitutional: He is oriented to person, place, and time. He appears well-developed and well-nourished. No distress.   Cardiovascular:   Pulses:       Dorsalis pedis pulses are 2+ on the right side, and 2+ on the left side.        Posterior tibial pulses are 2+ on the right side, and 2+ on the left side.   < 3 sec capillary refill time to toes 1-5 bilateral. Feet are warm to touch proximally with distal cooling to the otes b/l. There is no hair growth on the feet and toes b/l. There is 2+ pitting edema b/l. No obvious spider veins or varicosities present b/l.      Musculoskeletal:   Equinus noted b/l ankles with < 10 deg DF noted. MMT 5/5 in DF/PF/Inv/Ev resistance with no reproduction of pain in any direction. Passive range of motion of ankle and pedal joints is painless b/l.     Feet:   Right Foot:   Protective Sensation: 10 sites tested. 7 sites sensed.   Left Foot:   Protective Sensation: 10 sites tested. 6 sites sensed.   Neurological: He is alert and oriented to person, place, and time. He has normal strength. He displays no atrophy and no tremor. A sensory deficit is present. He exhibits normal muscle tone.   Negative tinel sign bilateral. Diminished protective sensation bilateral   Skin: Skin is warm, dry and intact. No abrasion, no bruising, no burn, no ecchymosis, no laceration, no lesion, no petechiae and no rash noted. He is not diaphoretic. No cyanosis or erythema. No pallor. Nails show no clubbing.   Skin was thin and atrophic.     Nails 1-5  bilateral are thick 3-4 mm, long 3-6 mm, and discolored with subungual debris     Psychiatric: He has a normal mood and affect. His behavior is normal.             Assessment:       Encounter Diagnoses   Name Primary?    Type II diabetes mellitus with peripheral circulatory disorder Yes    Type II diabetes mellitus with neurological manifestations     Onychomycosis due to dermatophyte     Bilateral lower extremity edema          Plan:       Figueroa was seen today for diabetic foot exam and diabetes mellitus.    Diagnoses and all orders for this visit:    Type II diabetes mellitus with peripheral circulatory disorder    Type II diabetes mellitus with neurological manifestations    Onychomycosis due to dermatophyte    Bilateral lower extremity edema      I counseled the patient on his conditions, their implications and medical management.    Shoe inspection. Diabetic Foot Education. Patient reminded of the importance of good nutrition and blood sugar control to help prevent podiatric complications of diabetes. Patient instructed on proper foot hygeine. We discussed wearing proper shoe gear, daily foot inspections, never walking without protective shoe gear, never putting sharp instruments to feet    With patient's verbal consent, nails were aggressively reduced and debrided x 10 to their soft tissue attachment mechanically removing all offending nail and debris. Patient relates relief following the procedure. No anesthesia or hemostasis required. No blood loss.     Recommend OTC compression stockings for edema, is following up with vascular testing with his PCP, will defer to PCP for edema management.    Return 3 months routine care    Cam Trivedi DPM

## 2018-10-22 NOTE — LETTER
October 22, 2018      Lit Albarado MD  [address] [facility] [address] [place] [address]           [facility]  [address] [facility] [address] [place] [address]  Phone: [phone]          Patient: Figueroa Ann   MR Number: [id number]   YOB: 1950   Date of Visit: 10/22/2018       Dear Dr. Lit Albarado:    Thank you for referring Figueroa Ann to me for evaluation. Attached you will find relevant portions of my assessment and plan of care.    If you have questions, please do not hesitate to call me. I look forward to following Figueroa Ann along with you.    Sincerely,    Cam Trivedi, CLARISA    Enclosure  CC:  No Recipients    If you would like to receive this communication electronically, please contact [email or web address] or [phone] to request more information on [facility] Link access.    For providers and/or their staff who would like to refer a patient to [facility], please contact us through our one-stop-shop provider referral line, Arminda Pena, at [phone].    If you feel you have received this communication in error or would no longer like to receive these types of communications, please e-mail [email or web address]

## 2018-10-22 NOTE — TELEPHONE ENCOUNTER
Reason for Disposition   BP = 180/110 and missed most recent dose of blood pressure medication    Protocols used:  HIGH BLOOD PRESSURE-A-OH    Spoke to Mona in podiatry. She states that Mr. Ann's blood pressure was 190/106. Patient has not taken his medication today and he is asymptomatic.

## 2018-11-07 ENCOUNTER — PES CALL (OUTPATIENT)
Dept: ADMINISTRATIVE | Facility: CLINIC | Age: 68
End: 2018-11-07

## 2018-12-28 ENCOUNTER — PATIENT OUTREACH (OUTPATIENT)
Dept: ADMINISTRATIVE | Facility: HOSPITAL | Age: 68
End: 2018-12-28

## 2018-12-28 NOTE — PROGRESS NOTES
Health Maintenance Due   Topic Date Due    Eye Exam  04/12/1958    TETANUS VACCINE  04/12/1966    Colonoscopy  04/12/1998    Zoster Vaccine  04/12/2008    Abdominal Aortic Aneurysm Screening  04/12/2013    Hemoglobin A1c  12/26/2017    Lipid Panel  09/26/2018     Pre-visit outreach via mail

## 2018-12-28 NOTE — LETTER
December 28, 2018    Figueroa Ann  [address]  [place] [address]             [facility]  [address]  [facility] [address]  Phone: [phone] Dear Mr. Ann:    [facility] is committed to your overall health.  To help you get the most out of each of your visits, we will review your information to make sure you are up to date on all of your recommended tests and/or procedures.      Joel Hudson MD    has found that your chart shows you may be due for the following:    Annual Dilated Eye Exam  Tetanus and Shingles Immunizations  Colon cancer screening  Diabetic lab test, orders have been placed    If you have had any of the above done at another facility, please bring the records or information with you so that your record at [facility] will be complete.  If you would like to schedule any of these, please contact me.    If you are currently taking medication, please bring it with you to your appointment for review.    Sincerely,    Marie Olsen  Clinical Care Coordinator  [facility] [address] [facility] [address]  [place] [address]  Phone: [phone]   Fax: [phone]

## 2019-01-04 RX ORDER — CLOPIDOGREL BISULFATE 75 MG/1
TABLET ORAL
Qty: 90 TABLET | Refills: 0 | Status: SHIPPED | OUTPATIENT
Start: 2019-01-04 | End: 2019-04-04 | Stop reason: SDUPTHER

## 2019-01-29 ENCOUNTER — PATIENT OUTREACH (OUTPATIENT)
Dept: ADMINISTRATIVE | Facility: HOSPITAL | Age: 69
End: 2019-01-29

## 2019-01-29 DIAGNOSIS — Z12.11 SCREEN FOR COLON CANCER: Primary | ICD-10-CM

## 2019-01-29 DIAGNOSIS — E11.9 DIABETES MELLITUS WITHOUT COMPLICATION: ICD-10-CM

## 2019-01-29 NOTE — LETTER
January 29, 2019    Figueroa Ann  [address] [place] [address]             [facility]  [address]  [facility] [address]  Phone: [phone] Dear Mr. Ann:    [facility] is committed to your overall health.  To help you get the most out of each of your visits, we will review your information to make sure you are up to date on all of your recommended tests and/or procedures.      Joel Hudson MD    has found that your chart shows you may be due for the following:    Annual Dilated Eye Exam  Tetanus Immunization  Colon cancer screening  Shingles Immunization    REMINDER: lab appointment 2/12/19    If you have had any of the above done at another facility, please bring the records or information with you so that your record at [facility] will be complete. If you have not had any of these tests or procedures done recently and would like to complete this testing ,  please call [phone] or send a message through your [facility] portal to your provider's office.     If you have an upcoming scheduled appointment for the above test and/or procedures, please disregard this letter.    If you are currently taking medication, please bring it with you to your appointment for review.    Your [facility],  MD Marie Marin, Clinical Care Coordinator   [facility] [address] [place] [address]  Phone: [phone]   Fax: [phone]

## 2019-03-16 ENCOUNTER — PATIENT OUTREACH (OUTPATIENT)
Dept: ADMINISTRATIVE | Facility: HOSPITAL | Age: 69
End: 2019-03-16

## 2019-03-16 NOTE — LETTER
March 16, 2019    Figueroa Ann  [address] [place] [address]             [facility]  [address]  [facility] [address]  Phone: [phone] Dear Ms. Ann:    [facility] committed to your overall health.  To help you get the most out of each of your visits, we will review your information to make sure you are up to date on all of your recommended tests and/or procedures.      Joel Hudson MD    has found that your chart shows you may be due for the following:    Annual Dilated Eye Exam  Tetanus Immunization  Colon cancer screening  Shingles Immunization    REMINDER: lab appointment 4/1/19    If you have had any of the above done at another facility, please bring the records with you or Fax them to [phone] so that your record at [facility] will be complete. If you have not had any of these tests or procedures done recently and would like to complete this testing ,  please call [phone] or send a message through your [facility] portal to your provider's office.     If you have an upcoming scheduled appointment for the above test and/or procedures, please disregard this letter.    If you are currently taking medication, please bring it with you to your appointment for review.    Sincerely,    MD Marie Marin  Clinical Care Coordinator  [facility]

## 2019-03-19 ENCOUNTER — PATIENT OUTREACH (OUTPATIENT)
Dept: ADMINISTRATIVE | Facility: HOSPITAL | Age: 69
End: 2019-03-19

## 2019-04-04 RX ORDER — CLOPIDOGREL BISULFATE 75 MG/1
TABLET ORAL
Qty: 90 TABLET | Refills: 1 | Status: SHIPPED | OUTPATIENT
Start: 2019-04-04 | End: 2019-09-30 | Stop reason: SDUPTHER

## 2019-06-10 ENCOUNTER — PATIENT OUTREACH (OUTPATIENT)
Dept: ADMINISTRATIVE | Facility: HOSPITAL | Age: 69
End: 2019-06-10

## 2019-06-10 NOTE — PROGRESS NOTES
Health Maintenance Due   Topic Date Due    Eye Exam  04/12/1958    TETANUS VACCINE  04/12/1966    Shingles Vaccine (1 of 2) 04/12/1998    Colonoscopy  04/12/1998    Abdominal Aortic Aneurysm Screening  04/12/2013    Hemoglobin A1c  12/26/2017    Lipid Panel  09/26/2018     Chart review complete/scrubbed 06/10/2019

## 2019-06-25 ENCOUNTER — LAB VISIT (OUTPATIENT)
Dept: LAB | Facility: HOSPITAL | Age: 69
End: 2019-06-25
Attending: FAMILY MEDICINE
Payer: MEDICARE

## 2019-06-25 ENCOUNTER — OFFICE VISIT (OUTPATIENT)
Dept: FAMILY MEDICINE | Facility: CLINIC | Age: 69
End: 2019-06-25
Payer: MEDICARE

## 2019-06-25 VITALS
BODY MASS INDEX: 29.97 KG/M2 | WEIGHT: 190.94 LBS | HEART RATE: 55 BPM | DIASTOLIC BLOOD PRESSURE: 84 MMHG | HEIGHT: 67 IN | OXYGEN SATURATION: 99 % | SYSTOLIC BLOOD PRESSURE: 136 MMHG

## 2019-06-25 DIAGNOSIS — I10 ESSENTIAL HYPERTENSION: ICD-10-CM

## 2019-06-25 DIAGNOSIS — Z12.11 COLON CANCER SCREENING: ICD-10-CM

## 2019-06-25 DIAGNOSIS — Z13.6 ENCOUNTER FOR ABDOMINAL AORTIC ANEURYSM (AAA) SCREENING: ICD-10-CM

## 2019-06-25 DIAGNOSIS — F17.200 TOBACCO USE DISORDER: ICD-10-CM

## 2019-06-25 PROCEDURE — 3079F DIAST BP 80-89 MM HG: CPT | Mod: S$GLB,,, | Performed by: FAMILY MEDICINE

## 2019-06-25 PROCEDURE — 99214 OFFICE O/P EST MOD 30 MIN: CPT | Mod: S$GLB,,, | Performed by: FAMILY MEDICINE

## 2019-06-25 PROCEDURE — 1101F PR PT FALLS ASSESS DOC 0-1 FALLS W/OUT INJ PAST YR: ICD-10-PCS | Mod: S$GLB,,, | Performed by: FAMILY MEDICINE

## 2019-06-25 PROCEDURE — 3075F PR MOST RECENT SYSTOLIC BLOOD PRESS GE 130-139MM HG: ICD-10-PCS | Mod: S$GLB,,, | Performed by: FAMILY MEDICINE

## 2019-06-25 PROCEDURE — 99214 PR OFFICE/OUTPT VISIT, EST, LEVL IV, 30-39 MIN: ICD-10-PCS | Mod: S$GLB,,, | Performed by: FAMILY MEDICINE

## 2019-06-25 PROCEDURE — 99999 PR PBB SHADOW E&M-EST. PATIENT-LVL III: ICD-10-PCS | Mod: PBBFAC,,, | Performed by: FAMILY MEDICINE

## 2019-06-25 PROCEDURE — 99999 PR PBB SHADOW E&M-EST. PATIENT-LVL III: CPT | Mod: PBBFAC,,, | Performed by: FAMILY MEDICINE

## 2019-06-25 PROCEDURE — 3075F SYST BP GE 130 - 139MM HG: CPT | Mod: S$GLB,,, | Performed by: FAMILY MEDICINE

## 2019-06-25 PROCEDURE — 3079F PR MOST RECENT DIASTOLIC BLOOD PRESSURE 80-89 MM HG: ICD-10-PCS | Mod: S$GLB,,, | Performed by: FAMILY MEDICINE

## 2019-06-25 PROCEDURE — 1101F PT FALLS ASSESS-DOCD LE1/YR: CPT | Mod: S$GLB,,, | Performed by: FAMILY MEDICINE

## 2019-06-25 RX ORDER — INSULIN LISPRO 100 [IU]/ML
INJECTION, SOLUTION INTRAVENOUS; SUBCUTANEOUS
COMMUNITY
Start: 2017-06-29

## 2019-06-25 NOTE — PROGRESS NOTES
Subjective:       Patient ID: Figueroa Ann is a 69 y.o. male.    Chief Complaint: Diabetes    HPI       Here for a f/u.     Here with wife.       Type 2 dm:  From recall  Fastin-120  On nph insulin.   a1c 8% on 17     htn controlled.      CVA - x3 with last being in Oct 2016.  On Plavix.            Review of Systems      Review of Systems   Constitutional: Negative for fever and chills.   HENT: Negative for hearing loss and neck stiffness.    Eyes: Negative for redness and itching.   Respiratory: Negative for cough and choking.    Cardiovascular: Negative for chest pain and leg swelling.  Abdomen: Negative for abdominal pain and blood in stool.   Genitourinary: Negative for dysuria and flank pain.   Musculoskeletal: Negative for back pain and gait problem.   Neurological: Negative for light-headedness and headaches.   Hematological: Negative for adenopathy.   Psychiatric/Behavioral: Negative for behavioral problems.     Objective:      Physical Exam   HENT:   Head: Atraumatic.   Eyes: Pupils are equal, round, and reactive to light. Conjunctivae are normal.   Neck: Normal range of motion.   Cardiovascular: Normal rate and regular rhythm.   No murmur heard.  Pulmonary/Chest: Effort normal and breath sounds normal. He has no wheezes.   Lymphadenopathy:     He has no cervical adenopathy.         Lab Results   Component Value Date    HGBA1C 8.0 (H) 2017     Lab Results   Component Value Date    LDLCALC 87.2 2017    CREATININE 1.3 10/22/2018         Assessment:       1. Uncontrolled type 2 diabetes mellitus without complication, without long-term current use of insulin    2. Essential hypertension    3. Colon cancer screening    4. Encounter for abdominal aortic aneurysm (AAA) screening    5. Tobacco use disorder        Plan:       Uncontrolled type 2 diabetes mellitus without complication, without long-term current use of insulin  -     Hemoglobin A1c; Future; Expected date: 2019  -      Microalbumin/creatinine urine ratio; Future; Expected date: 09/25/2019    Essential hypertension    Colon cancer screening  -     Case request GI: COLONOSCOPY    Encounter for abdominal aortic aneurysm (AAA) screening  -     US Abdominal Aorta; Future; Expected date: 06/25/2019    Tobacco use disorder    Other orders  -     Cancel: (In Office Administered) Tdap Vaccine                  Plan:  See orders  Cont current meds  Labs pending today      Medication List with Changes/Refills   Current Medications    AMLODIPINE (NORVASC) 10 MG TABLET    Take 1 tablet (10 mg total) by mouth once daily.    ATORVASTATIN (LIPITOR) 10 MG TABLET    Take 1 tablet (10 mg total) by mouth once daily.    BLOOD SUGAR DIAGNOSTIC STRP    1 strip by Misc.(Non-Drug; Combo Route) route 3 (three) times daily before meals.    CLOPIDOGREL (PLAVIX) 75 MG TABLET    TAKE 1 TABLET DAILY FOR CIRCULATION *THANK YOU*    INSULIN LISPRO (HUMALOG U-100 INSULIN) 100 UNIT/ML INJECTION    Inject into the skin.    INSULIN NPH (NOVOLIN N) 100 UNIT/ML INJECTION    Inject 25 Units into the skin once daily. TID with sliding scale. Hold is less than 180    LANCETS MISC    1 lancet by Misc.(Non-Drug; Combo Route) route 3 (three) times daily before meals.    LISINOPRIL (PRINIVIL,ZESTRIL) 20 MG TABLET    Take 1 tablet (20 mg total) by mouth once daily.

## 2019-09-11 ENCOUNTER — PATIENT OUTREACH (OUTPATIENT)
Dept: ADMINISTRATIVE | Facility: HOSPITAL | Age: 69
End: 2019-09-11

## 2019-09-11 NOTE — PROGRESS NOTES
Health Maintenance Due   Topic Date Due    Eye Exam  04/12/1960    TETANUS VACCINE  04/12/1966    Shingles Vaccine (1 of 2) 04/12/1998    Colonoscopy  04/12/1998    Abdominal Aortic Aneurysm Screening  04/12/2013    Influenza Vaccine (1) 09/01/2019    Foot Exam  10/12/2019

## 2019-09-14 ENCOUNTER — PATIENT OUTREACH (OUTPATIENT)
Dept: ADMINISTRATIVE | Facility: HOSPITAL | Age: 69
End: 2019-09-14

## 2019-09-14 NOTE — LETTER
September 14, 2019    Figueroa Ann  [address] [place] [address]             [facility]  [address]  [facility] [address]  Phone: [phone] Dear Earl, [facility] is committed to your overall health and would like to ensure that you are up to date on all of your health maintenance testing.  Our records indicate that you are overdue for your  ANNUAL DIABETIC EYE EXAM (Diabetic Retinopathy screening).    If you recently had this exam done at another facility, please let us know so that we may obtain copies from that facility.  If you have a copy of this eye exam report, please provide a copy for us to scan into your chart.  You are welcome to request that the report be faxed to us at  ([phone]).   If you have an upcoming eye exam appointment at another facility, please provide them with our fax number so that they may fax the report to us.      Also,  If you have not had an eye exam in the past year, we now have a  Retinal Camera at the [facility].  Depending upon your insurance coverage, you may not have an additional copay for this visit if done on the same day you see a member of the Primary Care Team.   Please confirm with your insurance company.  Please disregard if you already have an upcoming scheduled eye exam appointment.    Sincerely,      Joel Hudson MD and your [facility]

## 2019-09-30 RX ORDER — CLOPIDOGREL BISULFATE 75 MG/1
TABLET ORAL
Qty: 90 TABLET | Refills: 0 | Status: SHIPPED | OUTPATIENT
Start: 2019-09-30

## 2019-09-30 NOTE — TELEPHONE ENCOUNTER
Needs an appointment. Needs appointment with me in my next available regular appointment time slot (do not use any hold spots).  Refill to appointment please.

## 2019-10-16 DIAGNOSIS — E78.5 DYSLIPIDEMIA: ICD-10-CM

## 2019-10-16 DIAGNOSIS — I10 ESSENTIAL HYPERTENSION: ICD-10-CM

## 2019-10-16 DIAGNOSIS — I63.9 CEREBROVASCULAR ACCIDENT (CVA), UNSPECIFIED MECHANISM: ICD-10-CM

## 2019-10-17 NOTE — PROGRESS NOTES
Refill Authorization Note     is requesting a refill authorization.    Brief assessment and rationale for refill: DEFER: Not previously prescribed by you/Needs OV/Needs labs  Name and strength of medication: HUMULIN N 100U/ML VIAL MG INJECTABLE  Medication-related problems identified:   Non-adherence (knowledge deficit) non-intentional  Requires labs  Requires appointment    Medication Therapy Plan: DM LCO uncontrolled 6/19; A1C stable 6/19; Lipids WNL 6/19; SCr wnl 10/18; medmined could not find pt- no adherence in med list/NPH not prescribed since 2018; Needs OV with pcp; NTBO (CBC/CMP); defer to you    Medication reconciliation completed: Yes              How patient will take medication: inj 25 u tid with sliding scale          Comments:   Last A1C: 6 months  Lab Results   Component Value Date    HGBA1C 6.2 (H) 06/25/2019    HGBA1C 8.0 (H) 09/26/2017    No results found for: LABA1C     Last BMP: 12 months  Lab Results   Component Value Date     10/22/2018    K 4.0 10/22/2018     10/22/2018    CO2 26 10/22/2018    BUN 17 10/22/2018    CALCIUM 9.8 10/22/2018    ANIONGAP 8 10/22/2018    Lab Results   Component Value Date    CREATININE 1.3 10/22/2018    ESTGFRAFRICA >60.0 10/22/2018    EGFRNONAA 55.3 (A) 10/22/2018        Digital Medicine Data: values will display if enrolled  Last 6 Patient Entered Readings                                          Most Recent A1c:      There is no flowsheet data to display.        - History of Stroke               Appointments past 12m or future 3m    Date Provider   Last Visit   6/25/2019 Lit Albarado MD   Next Visit   Visit date not found Lit Albarado MD

## 2019-10-17 NOTE — TELEPHONE ENCOUNTER
Please see the following assessment. This refill request still requires some action on your part. Humulin N not previously prescribed by you. Defer to you. Will follow up on labs and appointment after your decision. Thank you.

## 2019-10-18 DIAGNOSIS — I10 ESSENTIAL HYPERTENSION: Primary | ICD-10-CM

## 2019-10-18 RX ORDER — ATORVASTATIN CALCIUM 10 MG/1
TABLET, FILM COATED ORAL
Qty: 90 TABLET | Refills: 0 | Status: SHIPPED | OUTPATIENT
Start: 2019-10-18 | End: 2020-01-16

## 2019-10-18 RX ORDER — AMLODIPINE BESYLATE 10 MG/1
TABLET ORAL
Qty: 90 TABLET | Refills: 2 | Status: SHIPPED | OUTPATIENT
Start: 2019-10-18

## 2019-10-18 RX ORDER — LISINOPRIL 20 MG/1
TABLET ORAL
Qty: 90 TABLET | Refills: 2 | Status: SHIPPED | OUTPATIENT
Start: 2019-10-18 | End: 2020-07-13

## 2019-10-18 NOTE — PROGRESS NOTES
Refill Authorization Note     is requesting a refill authorization.    Brief assessment and rationale for refill: APPROVE: Need labs  Name and strength of medication: amLODIPine (NORVASC) 10 MG tablet // lisinopril (PRINIVIL,ZESTRIL) 20 MG tablet // atorvastatin (LIPITOR) 10 MG tablet  Medication-related problems identified: Requires labs    Medication Therapy Plan: HTN controlled lco(6/19); Dyslipidemia lco(10/18); labs wnl; NTBO(CMP); approve 3 atorvastatin; approve 9 amlodipine and lisinopril    Medication reconciliation completed: No              How patient will take medication: t1t po qd  // t1t po qd // t1t po qd          Comments:   Blood Pressure Readings: <139/89mmHg 12 months   BP Readings from Last 3 Encounters:   06/25/19 136/84   10/22/18 (!) 190/106   10/12/18 138/88         Last Kidney  Lab Results   Component Value Date    CREATININE 1.3 10/22/2018    CREATININE 1.2 09/26/2017     Lab Results   Component Value Date    K 4.0 10/22/2018    K 4.3 09/26/2017     Lab Results   Component Value Date     10/22/2018     09/26/2017      Lab Results   Component Value Date    BUN 17 10/22/2018    BUN 13 09/26/2017     Lab Results   Component Value Date    CO2 26 10/22/2018    CO2 29 09/26/2017        Last Lipids 12 months  Lab Results   Component Value Date    CHOL 139 06/25/2019    CHOL 151 09/26/2017    Lab Results   Component Value Date    HDL 43 06/25/2019    HDL 32 (L) 09/26/2017      Lab Results   Component Value Date    TRIG 77 06/25/2019    TRIG 159 (H) 09/26/2017    Lab Results   Component Value Date    LDLCALC 80.6 06/25/2019    LDLCALC 87.2 09/26/2017       Lab Results   Component Value Date    CHOLHDL 30.9 06/25/2019    CHOLHDL 21.2 09/26/2017        Appointments past 12m or future 3m    Date Provider   Last Visit   6/25/2019 Lit Albarado MD   Next Visit   Visit date not found Lit Albarado MD

## 2019-10-18 NOTE — TELEPHONE ENCOUNTER
----- Message from Natalie Adams sent at 10/18/2019  3:27 PM CDT -----  Contact: Wife  Type: Needs Medical Advice    Who Called:  Jennyfer Scanlon Call Back Number:  please call this number  Additional Information: patient is out of Insulin the wife said the pharm sent a request for a new Rx but the Dr has yet to respond and she is at the pharmacy an wants to know if [facility] will send a new Rx for his insulin now please or call to advise wife said he must have his Insulin today.

## 2019-10-18 NOTE — TELEPHONE ENCOUNTER
Please advise on refill request as Dr. Hudson is out of office today. Patient is at the pharmacy waiting

## 2019-10-21 ENCOUNTER — TELEPHONE (OUTPATIENT)
Dept: FAMILY MEDICINE | Facility: CLINIC | Age: 69
End: 2019-10-21

## 2019-10-21 RX ORDER — INSULIN HUMAN 100 [IU]/ML
INJECTION, SUSPENSION SUBCUTANEOUS
Qty: 70 ML | Refills: 0 | Status: SHIPPED | OUTPATIENT
Start: 2019-10-21

## 2019-10-21 NOTE — TELEPHONE ENCOUNTER
Patient very non complaint with Dr f/u.  Recent No show for me.  Many cancellations with Dr Méndez.  Sent insulin Rx in with NO refills.  Patient needs an appointment with me.  Needs appointment with me in my next available regular appointment time slot (do not use any hold spots).  Refill to appointment please.   40 min apt.

## 2019-11-15 ENCOUNTER — TELEPHONE (OUTPATIENT)
Dept: GASTROENTEROLOGY | Facility: CLINIC | Age: 69
End: 2019-11-15

## 2019-11-15 ENCOUNTER — PES CALL (OUTPATIENT)
Dept: ADMINISTRATIVE | Facility: CLINIC | Age: 69
End: 2019-11-15

## 2019-11-18 NOTE — TELEPHONE ENCOUNTER
Refill Routing Note    Medication(s) are appropriate for refill Outside of protocol      Requested Prescriptions   Pending Prescriptions Disp Refills    clopidogrel (PLAVIX) 75 mg tablet 90 tablet 0       There is no refill protocol information for this order

## 2019-11-20 RX ORDER — CLOPIDOGREL BISULFATE 75 MG/1
TABLET ORAL
Qty: 90 TABLET | Refills: 0 | OUTPATIENT
Start: 2019-11-20

## 2019-11-20 NOTE — TELEPHONE ENCOUNTER
Not seen by me > 2 years.  Needs appointment with ME in my next available regular appointment time slot (do not use any hold spots).  Refill to appointment please.

## 2019-11-21 ENCOUNTER — TELEPHONE (OUTPATIENT)
Dept: GASTROENTEROLOGY | Facility: CLINIC | Age: 69
End: 2019-11-21

## 2019-11-21 NOTE — TELEPHONE ENCOUNTER
I have not seen this patient in over 2 years.  He has been poorly compliant with follow up.  Further, I only met him once.  I am not going to approve holding Plavix or anything further on this patient until I see him.  Needs appointment with me in my next available regular appointment time slot for 40 min. (do not use any hold spots).

## 2019-11-21 NOTE — TELEPHONE ENCOUNTER
Spoke with pt's wife. Scheduled ordered c-scope. Wife verbalized understanding. Prep instructions & rx, reminder letter placed in mail.    Pt scheduled for colonoscopy with Dr. Garcia on 2/3/20. Can pt safely hold plavix for 5 days prior?  Please advise

## 2019-12-30 RX ORDER — CLOPIDOGREL BISULFATE 75 MG/1
TABLET ORAL
Qty: 90 TABLET | Refills: 0 | OUTPATIENT
Start: 2019-12-30

## 2019-12-30 NOTE — TELEPHONE ENCOUNTER
Needs appointment  Needs appointment with me in my next available regular appointment time slot (do not use any hold spots).  Refill to appointment please.

## 2020-01-10 ENCOUNTER — TELEPHONE (OUTPATIENT)
Dept: ADMINISTRATIVE | Facility: HOSPITAL | Age: 70
End: 2020-01-10

## 2020-01-10 DIAGNOSIS — E11.9 DIABETES MELLITUS WITHOUT COMPLICATION: Primary | ICD-10-CM

## 2020-01-15 NOTE — TELEPHONE ENCOUNTER
----- Message from Yuridia Morales sent at 1/15/2020 10:13 AM CST -----    Pt  Will  Be  About another  10 to  15 mins // please call  For details [phone]  Per  wife

## 2020-01-16 NOTE — PROGRESS NOTES
Refill Authorization Note     is requesting a refill authorization.    Brief assessment and rationale for refill: REVIEW: not previously prescribed by you / needs labs     Medication-related problems identified: Requires labs    Medication Therapy Plan: NTBS(CMP)                              Comments:   Requested Prescriptions   Pending Prescriptions Disp Refills    atorvastatin (LIPITOR) 10 MG tablet [Pharmacy Med Name: ATORVASTATIN 10MG TABLET] 90 tablet 0     Sig: TAKE 1 TABLET DAILY FOR CHOLESTEROL *THANK YOU*       Cardiovascular:  Antilipid - Statins Failed - 1/15/2020  1:37 PM        Failed - ALT is 94 or below and within 360 days     ALT   Date Value Ref Range Status   10/22/2018 18 10 - 44 U/L Final   09/26/2017 22 10 - 44 U/L Final              Failed - AST is 54 or below and within 360 days     AST   Date Value Ref Range Status   10/22/2018 27 10 - 40 U/L Final   09/26/2017 29 10 - 40 U/L Final              Passed - Patient is at least 18 years old        Passed - Office visit in past 12 months or future 90 days     Recent Outpatient Visits            6 months ago Uncontrolled type 2 diabetes mellitus without complication, without long-term current use of insulin    [facility]ado [facility]    1 year ago Type II diabetes mellitus with peripheral circulatory disorder    [facility]    1 year ago Uncontrolled type 2 diabetes mellitus without complication, without long-term current use of insulin    [facility] Ag Albarado MD    2 years ago Perforation of right tympanic membrane    [place] [facility] Christopher Moura MD    2 years ago Encounter for comprehensive diabetic foot examination, type 2 diabetes mellitus    [facility] T[facility]edi D[facility]          Future Appointments              In 4 months Joel Hudson MD [facility] [place]                Passed - Lipid Panel completed in last  360 days     Lab Results   Component Value Date    CHOL 139 06/25/2019    HDL 43 06/25/2019    LDLCALC 80.6 06/25/2019    TRIG 77 06/25/2019

## 2020-01-16 NOTE — TELEPHONE ENCOUNTER
Please see the following assessment. This refill request still requires some action on your part.      Atorvastatin has not previously been prescribed by current PCP. Outside refill center protocol to renew. Have pended a for your review. Defer refill request to you.      Thank you.

## 2020-01-17 NOTE — TELEPHONE ENCOUNTER
Move patient apt closer using 2x AREx for 40min and send refill request back to me.  Not seen in 2 yr; missed last apt - showed up 50 min late.

## 2020-01-22 NOTE — TELEPHONE ENCOUNTER
Patient appointment rescheduled for 2/4/2020 at 9:10am and he is aware of date/time. Please advise regarding refill.

## 2020-01-23 RX ORDER — ATORVASTATIN CALCIUM 10 MG/1
TABLET, FILM COATED ORAL
Qty: 30 TABLET | Refills: 0 | Status: SHIPPED | OUTPATIENT
Start: 2020-01-23

## 2020-02-04 ENCOUNTER — OFFICE VISIT (OUTPATIENT)
Dept: FAMILY MEDICINE | Facility: CLINIC | Age: 70
End: 2020-02-04
Payer: MEDICARE

## 2020-02-04 ENCOUNTER — LAB VISIT (OUTPATIENT)
Dept: LAB | Facility: HOSPITAL | Age: 70
End: 2020-02-04
Attending: INTERNAL MEDICINE
Payer: MEDICARE

## 2020-02-04 VITALS
BODY MASS INDEX: 31.44 KG/M2 | DIASTOLIC BLOOD PRESSURE: 88 MMHG | HEART RATE: 110 BPM | OXYGEN SATURATION: 99 % | WEIGHT: 200.31 LBS | HEIGHT: 67 IN | SYSTOLIC BLOOD PRESSURE: 128 MMHG

## 2020-02-04 DIAGNOSIS — Z86.73 HISTORY OF CARDIOEMBOLIC CEREBROVASCULAR ACCIDENT (CVA): ICD-10-CM

## 2020-02-04 DIAGNOSIS — E78.5 DYSLIPIDEMIA: ICD-10-CM

## 2020-02-04 DIAGNOSIS — E11.9 CONTROLLED TYPE 2 DIABETES MELLITUS WITHOUT COMPLICATION, WITHOUT LONG-TERM CURRENT USE OF INSULIN: ICD-10-CM

## 2020-02-04 DIAGNOSIS — Z71.85 VACCINE COUNSELING: ICD-10-CM

## 2020-02-04 DIAGNOSIS — Z00.00 ROUTINE PHYSICAL EXAMINATION: Primary | ICD-10-CM

## 2020-02-04 DIAGNOSIS — I10 ESSENTIAL HYPERTENSION: ICD-10-CM

## 2020-02-04 DIAGNOSIS — R15.9 INCONTINENCE OF FECES, UNSPECIFIED FECAL INCONTINENCE TYPE: ICD-10-CM

## 2020-02-04 DIAGNOSIS — Z12.11 SCREEN FOR COLON CANCER: ICD-10-CM

## 2020-02-04 LAB
ALBUMIN SERPL BCP-MCNC: 3.9 G/DL (ref 3.5–5.2)
ALP SERPL-CCNC: 64 U/L (ref 55–135)
ALT SERPL W/O P-5'-P-CCNC: 22 U/L (ref 10–44)
ANION GAP SERPL CALC-SCNC: 7 MMOL/L (ref 8–16)
AST SERPL-CCNC: 37 U/L (ref 10–40)
BILIRUB SERPL-MCNC: 1.1 MG/DL (ref 0.1–1)
BUN SERPL-MCNC: 16 MG/DL (ref 8–23)
CALCIUM SERPL-MCNC: 9.6 MG/DL (ref 8.7–10.5)
CHLORIDE SERPL-SCNC: 108 MMOL/L (ref 95–110)
CHOLEST SERPL-MCNC: 132 MG/DL (ref 120–199)
CHOLEST/HDLC SERPL: 3.1 {RATIO} (ref 2–5)
CO2 SERPL-SCNC: 28 MMOL/L (ref 23–29)
CREAT SERPL-MCNC: 1.3 MG/DL (ref 0.5–1.4)
EST. GFR  (AFRICAN AMERICAN): >60 ML/MIN/1.73 M^2
EST. GFR  (NON AFRICAN AMERICAN): 54.9 ML/MIN/1.73 M^2
ESTIMATED AVG GLUCOSE: 137 MG/DL (ref 68–131)
GLUCOSE SERPL-MCNC: 97 MG/DL (ref 70–110)
HBA1C MFR BLD HPLC: 6.4 % (ref 4–5.6)
HDLC SERPL-MCNC: 42 MG/DL (ref 40–75)
HDLC SERPL: 31.8 % (ref 20–50)
LDLC SERPL CALC-MCNC: 77.6 MG/DL (ref 63–159)
NONHDLC SERPL-MCNC: 90 MG/DL
POTASSIUM SERPL-SCNC: 4.8 MMOL/L (ref 3.5–5.1)
PROT SERPL-MCNC: 7.1 G/DL (ref 6–8.4)
SODIUM SERPL-SCNC: 143 MMOL/L (ref 136–145)
TRIGL SERPL-MCNC: 62 MG/DL (ref 30–150)

## 2020-02-04 PROCEDURE — 90732 PNEUMOCOCCAL POLYSACCHARIDE VACCINE 23-VALENT =>2YO SQ IM: ICD-10-PCS | Mod: S$GLB,,, | Performed by: INTERNAL MEDICINE

## 2020-02-04 PROCEDURE — 3079F DIAST BP 80-89 MM HG: CPT | Mod: S$GLB,,, | Performed by: INTERNAL MEDICINE

## 2020-02-04 PROCEDURE — 90715 TDAP VACCINE GREATER THAN OR EQUAL TO 7YO IM: ICD-10-PCS | Mod: S$GLB,,, | Performed by: INTERNAL MEDICINE

## 2020-02-04 PROCEDURE — 90732 PPSV23 VACC 2 YRS+ SUBQ/IM: CPT | Mod: S$GLB,,, | Performed by: INTERNAL MEDICINE

## 2020-02-04 PROCEDURE — 3044F HG A1C LEVEL LT 7.0%: CPT | Mod: S$GLB,,, | Performed by: INTERNAL MEDICINE

## 2020-02-04 PROCEDURE — G0009 ADMIN PNEUMOCOCCAL VACCINE: HCPCS | Mod: 59,S$GLB,, | Performed by: INTERNAL MEDICINE

## 2020-02-04 PROCEDURE — G0009 PNEUMOCOCCAL POLYSACCHARIDE VACCINE 23-VALENT =>2YO SQ IM: ICD-10-PCS | Mod: 59,S$GLB,, | Performed by: INTERNAL MEDICINE

## 2020-02-04 PROCEDURE — 3079F PR MOST RECENT DIASTOLIC BLOOD PRESSURE 80-89 MM HG: ICD-10-PCS | Mod: S$GLB,,, | Performed by: INTERNAL MEDICINE

## 2020-02-04 PROCEDURE — 99397 PER PM REEVAL EST PAT 65+ YR: CPT | Mod: 25,S$GLB,, | Performed by: INTERNAL MEDICINE

## 2020-02-04 PROCEDURE — 99397 PR PREVENTIVE VISIT,EST,65 & OVER: ICD-10-PCS | Mod: 25,S$GLB,, | Performed by: INTERNAL MEDICINE

## 2020-02-04 PROCEDURE — 80061 LIPID PANEL: CPT

## 2020-02-04 PROCEDURE — 3074F PR MOST RECENT SYSTOLIC BLOOD PRESSURE < 130 MM HG: ICD-10-PCS | Mod: S$GLB,,, | Performed by: INTERNAL MEDICINE

## 2020-02-04 PROCEDURE — 90471 IMMUNIZATION ADMIN: CPT | Mod: S$GLB,,, | Performed by: INTERNAL MEDICINE

## 2020-02-04 PROCEDURE — 3074F SYST BP LT 130 MM HG: CPT | Mod: S$GLB,,, | Performed by: INTERNAL MEDICINE

## 2020-02-04 PROCEDURE — 36415 COLL VENOUS BLD VENIPUNCTURE: CPT | Mod: PO

## 2020-02-04 PROCEDURE — 90471 TDAP VACCINE GREATER THAN OR EQUAL TO 7YO IM: ICD-10-PCS | Mod: S$GLB,,, | Performed by: INTERNAL MEDICINE

## 2020-02-04 PROCEDURE — 90715 TDAP VACCINE 7 YRS/> IM: CPT | Mod: S$GLB,,, | Performed by: INTERNAL MEDICINE

## 2020-02-04 PROCEDURE — 83036 HEMOGLOBIN GLYCOSYLATED A1C: CPT

## 2020-02-04 PROCEDURE — 99999 PR PBB SHADOW E&M-EST. PATIENT-LVL III: CPT | Mod: PBBFAC,,, | Performed by: INTERNAL MEDICINE

## 2020-02-04 PROCEDURE — 3044F PR MOST RECENT HEMOGLOBIN A1C LEVEL <7.0%: ICD-10-PCS | Mod: S$GLB,,, | Performed by: INTERNAL MEDICINE

## 2020-02-04 PROCEDURE — 99999 PR PBB SHADOW E&M-EST. PATIENT-LVL III: ICD-10-PCS | Mod: PBBFAC,,, | Performed by: INTERNAL MEDICINE

## 2020-02-04 PROCEDURE — 80053 COMPREHEN METABOLIC PANEL: CPT

## 2020-02-04 NOTE — PROGRESS NOTES
Subjective:       Patient ID: Figueroa Ann is a 69 y.o. male.    Chief Complaint: Annual Exam    Here for routine health maintenance.      Patient not seen by me since 9/2017.  Multiple no shows and cancellations.  Relies on his wife to bring him.  Wife present who assists with HPI/ROS    Complains of fecal incontinence new over past few months.  Occurs about 2x/week.  Wife states he may have a lot of issues with colon prep.     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  On Plavix.   DM - eye Dr in [place] due for eye exam.  Saw about 1 yr ago.  HTN - controlled  HLD - controlled on statin; gaol < 70 CVA    Labs today    Review of Systems   Constitutional: Negative for appetite change and fever.   HENT: Negative for nosebleeds and trouble swallowing.    Eyes: Negative for discharge and visual disturbance.   Respiratory: Negative for choking and shortness of breath.    Cardiovascular: Negative for chest pain and palpitations.   Gastrointestinal: Negative for abdominal pain, nausea and vomiting.   Musculoskeletal: Negative for arthralgias and joint swelling.   Skin: Negative for rash and wound.   Neurological: Negative for dizziness and syncope.   Psychiatric/Behavioral: Negative for confusion and dysphoric mood.       Objective:      Vitals:    02/04/20 0916   BP: 128/88   Pulse: 110     Physical Exam   Constitutional: He appears well-nourished.   Eyes: Conjunctivae and EOM are normal.   Neck: Trachea normal and normal range of motion. No thyromegaly present.   Cardiovascular: Normal heart sounds.   Pulses:       Dorsalis pedis pulses are 2+ on the right side, and 2+ on the left side.   Edema negative   Pulmonary/Chest: Effort normal and breath sounds normal.   Abdominal: Soft. There is no hepatomegaly.   Feet:   Right Foot:   Protective Sensation: 4 sites tested. 0 sites sensed.   Left Foot:   Protective Sensation: 4 sites tested. 0 sites sensed.   Neurological: No cranial nerve deficit.   DTR decreased  bilateral   Skin: Skin is warm, dry and intact.   Psychiatric: He has a normal mood and affect.   Alert and Oriented only to person   Vitals reviewed.        Assessment:       1. Routine physical examination    2. Essential hypertension    3. Dyslipidemia    4. Controlled type 2 diabetes mellitus without complication, without long-term current use of insulin    5. Screen for colon cancer    6. History of cardioembolic cerebrovascular accident (CVA)    7. Vaccine counseling    8. Incontinence of feces, unspecified fecal incontinence type        Plan:       Routine physical examination    Essential hypertension  -     Comprehensive metabolic panel; Future; Expected date: 02/04/2020    Dyslipidemia  -     Lipid panel; Future; Expected date: 02/04/2020    Controlled type 2 diabetes mellitus without complication, without long-term current use of insulin  -     Hemoglobin A1c; Future; Expected date: 02/04/2020  -     Microalbumin/creatinine urine ratio; Future; Expected date: 02/04/2020    Screen for colon cancer  -     Fecal Immunochemical Test (iFOBT); Future; Expected date: 02/04/2020    History of cardioembolic cerebrovascular accident (CVA)    Vaccine counseling  -     Tdap Vaccine  -     Pneumococcal Polysaccharide Vaccine (23 Valent) (SQ/IM)    Incontinence of feces, unspecified fecal incontinence type  -     Ambulatory referral/consult to Gastroenterology; Future; Expected date: 02/11/2020            Medication List with Changes/Refills   Current Medications    AMLODIPINE (NORVASC) 10 MG TABLET    TAKE 1 TABLET DAILY FOR BLOOD PRESSURE *THANK YOU*    ATORVASTATIN (LIPITOR) 10 MG TABLET    TAKE 1 TABLET DAILY FOR CHOLESTEROL *THANK YOU*    BLOOD SUGAR DIAGNOSTIC STRP    1 strip by Misc.(Non-Drug; Combo Route) route 3 (three) times daily before meals.    CLOPIDOGREL (PLAVIX) 75 MG TABLET    TAKE 1 TABLET DAILY FOR CIRCULATION *THANK YOU*    HUMULIN N NPH U-100 INSULIN 100 UNIT/ML INJECTION    INJECT 25 UNITS INTO  THE SKIN 3 TIMES DAILY WITH SLIDING SCALE HOLD IF LESS THAN 180 *THANK YOU*    INSULIN LISPRO (HUMALOG U-100 INSULIN) 100 UNIT/ML INJECTION    Inject into the skin.    LANCETS MISC    1 lancet by Misc.(Non-Drug; Combo Route) route 3 (three) times daily before meals.    LISINOPRIL (PRINIVIL,ZESTRIL) 20 MG TABLET    TAKE 1 TABLET DAILY FOR BLOOD PRESSURE *THANK YOU*     Wellness reviewed   Continue current management and monitor.    Counseled on regular exercise, maintenance of a healthy weight, balanced diet rich in fruits/vegetables and lean protein, and avoidance of unhealthy habits like smoking and excessive alcohol intake.   Also, counseled on importance of being compliant with medication, health appointments, diet and exercise.     Follow up in about 6 months (around 8/4/2020). c l

## 2020-03-30 ENCOUNTER — PATIENT OUTREACH (OUTPATIENT)
Dept: ADMINISTRATIVE | Facility: OTHER | Age: 70
End: 2020-03-30

## 2020-03-31 ENCOUNTER — OFFICE VISIT (OUTPATIENT)
Dept: GASTROENTEROLOGY | Facility: CLINIC | Age: 70
End: 2020-03-31
Payer: MEDICARE

## 2020-03-31 ENCOUNTER — TELEPHONE (OUTPATIENT)
Dept: GASTROENTEROLOGY | Facility: CLINIC | Age: 70
End: 2020-03-31

## 2020-03-31 VITALS — WEIGHT: 183.44 LBS | BODY MASS INDEX: 28.79 KG/M2 | HEIGHT: 67 IN | RESPIRATION RATE: 18 BRPM

## 2020-03-31 DIAGNOSIS — R15.9 INCONTINENCE OF FECES, UNSPECIFIED FECAL INCONTINENCE TYPE: ICD-10-CM

## 2020-03-31 PROCEDURE — 99999 PR PBB SHADOW E&M-EST. PATIENT-LVL III: CPT | Mod: PBBFAC,,, | Performed by: INTERNAL MEDICINE

## 2020-03-31 PROCEDURE — 1101F PR PT FALLS ASSESS DOC 0-1 FALLS W/OUT INJ PAST YR: ICD-10-PCS | Mod: S$GLB,,, | Performed by: INTERNAL MEDICINE

## 2020-03-31 PROCEDURE — 1101F PT FALLS ASSESS-DOCD LE1/YR: CPT | Mod: S$GLB,,, | Performed by: INTERNAL MEDICINE

## 2020-03-31 PROCEDURE — 99203 OFFICE O/P NEW LOW 30 MIN: CPT | Mod: S$GLB,,, | Performed by: INTERNAL MEDICINE

## 2020-03-31 PROCEDURE — 99999 PR PBB SHADOW E&M-EST. PATIENT-LVL III: ICD-10-PCS | Mod: PBBFAC,,, | Performed by: INTERNAL MEDICINE

## 2020-03-31 PROCEDURE — 1126F PR PAIN SEVERITY QUANTIFIED, NO PAIN PRESENT: ICD-10-PCS | Mod: S$GLB,,, | Performed by: INTERNAL MEDICINE

## 2020-03-31 PROCEDURE — 99203 PR OFFICE/OUTPT VISIT, NEW, LEVL III, 30-44 MIN: ICD-10-PCS | Mod: S$GLB,,, | Performed by: INTERNAL MEDICINE

## 2020-03-31 PROCEDURE — 1159F MED LIST DOCD IN RCRD: CPT | Mod: S$GLB,,, | Performed by: INTERNAL MEDICINE

## 2020-03-31 PROCEDURE — 1126F AMNT PAIN NOTED NONE PRSNT: CPT | Mod: S$GLB,,, | Performed by: INTERNAL MEDICINE

## 2020-03-31 PROCEDURE — 1159F PR MEDICATION LIST DOCUMENTED IN MEDICAL RECORD: ICD-10-PCS | Mod: S$GLB,,, | Performed by: INTERNAL MEDICINE

## 2020-03-31 NOTE — PROGRESS NOTES
Pt presents for evaluation of fecal incontinence. Pt's wife assisted with history. Intermittent incontinence, usually several times per week. Symptoms have been going on for many months. No rectal bleeding. Stool describes as formed or semi-formed, one or two bowel movements daily. Denies abd pain or N/V. No true diarrhea or constipation.Tolerating diet. No fever or jaundice. No prior C-scope.    REVIEW OF SYSTEMS:   Constitutional: Negative for fever, appetite change and unexpected weight change.  HENT: Negative for sore throat and trouble swallowing.  Eyes: Negative for visual disturbance.  Respiratory: Negative for chest tightness, shortness of breath and wheezing.  Cardiovascular: Negative for chest pain.  Gastrointestinal:  as per HPI  Genitourinary: Negative for dysuria, frequency and hematuria.  Musculoskeletal: Negative for myalgias, joint swelling and arthralgias.  Skin: Negative for color change and rash.     PHYSICAL EXAMINATION:                                                        GENERAL:  Comfortable, in no acute distress.      SKIN: Non-jaundiced.                             HEENT EXAM:  Nonicteric.  No adenopathy.  Oropharynx is clear.               NECK:  Supple.                                                               LUNGS:  Clear.                                                               CARDIAC:  Regular rate and rhythm.  S1, S2.  No murmur.                      ABDOMEN:  Soft, positive bowel sounds, nontender.  No hepatosplenomegaly or masses.  No rebound or guarding.                                             EXTREMITIES:  No edema.       IMP: 1. Fecal incontinence    PLAN: 1. C-scope

## 2020-03-31 NOTE — TELEPHONE ENCOUNTER
Attempted to contact patient, no answer. Unable to leave voicemail, will try again later.    Patient needs to schedule colonoscopy in 1-2 months, after covid concerns have  down. Order in system. Patient needs instructions for GoLytely, Humalin, Humalog, and Plavix.

## 2020-03-31 NOTE — LETTER
March 31, 2020      Joel Hudson MD  [address] [facility] [address] [place] [address]           [facility]  [address] [place] [address]  Phone: [phone]          Patient: Figueroa Ann   MR Number: [id number]   YOB: 1950   Date of Visit: 3/31/2020       Dear Dr. Jeol Hudson:    Thank you for referring Figueroa Ann to me for evaluation. Attached you will find relevant portions of my assessment and plan of care.    If you have questions, please do not hesitate to call me. I look forward to following Figueroa Ann along with you.    Sincerely,    Prakash Garcia MD    Enclosure  CC:  No Recipients    If you would like to receive this communication electronically, please contact [email or web address] or [phone] to request more information on [employer] access.    For providers and/or their staff who would like to refer a patient to [facility], please contact us through our one-stop-shop provider referral line, [facility] , at [phone].    If you feel you have received this communication in error or would no longer like to receive these types of communications, please e-mail [email or web address]

## 2020-05-25 ENCOUNTER — TELEPHONE (OUTPATIENT)
Dept: FAMILY MEDICINE | Facility: CLINIC | Age: 70
End: 2020-05-25

## 2020-05-25 ENCOUNTER — OFFICE VISIT (OUTPATIENT)
Dept: FAMILY MEDICINE | Facility: CLINIC | Age: 70
End: 2020-05-25
Payer: MEDICARE

## 2020-05-25 VITALS
BODY MASS INDEX: 26.75 KG/M2 | WEIGHT: 170.44 LBS | OXYGEN SATURATION: 99 % | TEMPERATURE: 98 F | DIASTOLIC BLOOD PRESSURE: 62 MMHG | HEIGHT: 67 IN | HEART RATE: 57 BPM | SYSTOLIC BLOOD PRESSURE: 116 MMHG

## 2020-05-25 DIAGNOSIS — R94.31 ABNORMAL EKG: ICD-10-CM

## 2020-05-25 DIAGNOSIS — R22.43 LOCALIZED SWELLING OF BOTH LOWER LEGS: Primary | ICD-10-CM

## 2020-05-25 PROCEDURE — 1159F PR MEDICATION LIST DOCUMENTED IN MEDICAL RECORD: ICD-10-PCS | Mod: S$GLB,,, | Performed by: NURSE PRACTITIONER

## 2020-05-25 PROCEDURE — 1101F PT FALLS ASSESS-DOCD LE1/YR: CPT | Mod: S$GLB,,, | Performed by: NURSE PRACTITIONER

## 2020-05-25 PROCEDURE — 3074F PR MOST RECENT SYSTOLIC BLOOD PRESSURE < 130 MM HG: ICD-10-PCS | Mod: S$GLB,,, | Performed by: NURSE PRACTITIONER

## 2020-05-25 PROCEDURE — 99214 OFFICE O/P EST MOD 30 MIN: CPT | Mod: S$GLB,,, | Performed by: NURSE PRACTITIONER

## 2020-05-25 PROCEDURE — 93005 EKG 12-LEAD: ICD-10-PCS | Mod: S$GLB,,, | Performed by: NURSE PRACTITIONER

## 2020-05-25 PROCEDURE — 93010 ELECTROCARDIOGRAM REPORT: CPT | Mod: S$GLB,,, | Performed by: INTERNAL MEDICINE

## 2020-05-25 PROCEDURE — 3078F PR MOST RECENT DIASTOLIC BLOOD PRESSURE < 80 MM HG: ICD-10-PCS | Mod: S$GLB,,, | Performed by: NURSE PRACTITIONER

## 2020-05-25 PROCEDURE — 1126F PR PAIN SEVERITY QUANTIFIED, NO PAIN PRESENT: ICD-10-PCS | Mod: S$GLB,,, | Performed by: NURSE PRACTITIONER

## 2020-05-25 PROCEDURE — 99999 PR PBB SHADOW E&M-EST. PATIENT-LVL IV: ICD-10-PCS | Mod: PBBFAC,,, | Performed by: NURSE PRACTITIONER

## 2020-05-25 PROCEDURE — 3078F DIAST BP <80 MM HG: CPT | Mod: S$GLB,,, | Performed by: NURSE PRACTITIONER

## 2020-05-25 PROCEDURE — 93010 EKG 12-LEAD: ICD-10-PCS | Mod: S$GLB,,, | Performed by: INTERNAL MEDICINE

## 2020-05-25 PROCEDURE — 1101F PR PT FALLS ASSESS DOC 0-1 FALLS W/OUT INJ PAST YR: ICD-10-PCS | Mod: S$GLB,,, | Performed by: NURSE PRACTITIONER

## 2020-05-25 PROCEDURE — 99214 PR OFFICE/OUTPT VISIT, EST, LEVL IV, 30-39 MIN: ICD-10-PCS | Mod: S$GLB,,, | Performed by: NURSE PRACTITIONER

## 2020-05-25 PROCEDURE — 1159F MED LIST DOCD IN RCRD: CPT | Mod: S$GLB,,, | Performed by: NURSE PRACTITIONER

## 2020-05-25 PROCEDURE — 93005 ELECTROCARDIOGRAM TRACING: CPT | Mod: S$GLB,,, | Performed by: NURSE PRACTITIONER

## 2020-05-25 PROCEDURE — 99999 PR PBB SHADOW E&M-EST. PATIENT-LVL IV: CPT | Mod: PBBFAC,,, | Performed by: NURSE PRACTITIONER

## 2020-05-25 PROCEDURE — 3074F SYST BP LT 130 MM HG: CPT | Mod: S$GLB,,, | Performed by: NURSE PRACTITIONER

## 2020-05-25 PROCEDURE — 1126F AMNT PAIN NOTED NONE PRSNT: CPT | Mod: S$GLB,,, | Performed by: NURSE PRACTITIONER

## 2020-05-25 RX ORDER — FUROSEMIDE 20 MG/1
20 TABLET ORAL DAILY
Qty: 14 TABLET | Refills: 0 | Status: SHIPPED | OUTPATIENT
Start: 2020-05-25 | End: 2020-08-11 | Stop reason: SDUPTHER

## 2020-05-25 NOTE — PATIENT INSTRUCTIONS
Start taking lasix in the morning once a day for 14 days, then stop  Elevate legs at night  Reduce salt intake  Increase water  Apply compression socks or hose daily while up moving around.  Continue all other medications  Monitor urine out[ut for clear yellow urine  Meet with cardiology

## 2020-05-25 NOTE — TELEPHONE ENCOUNTER
----- Message from Uma Wilson sent at 5/25/2020  9:30 AM CDT -----  Contact: Patient's wife Jennyfer  Type: Needs Medical Advice  Who Called:  Jennyfer  Symptoms (please be specific): swelling in feet and legs  How long has patient had these symptoms:  Several days  Pharmacy name and phone #:    [facility] - [place], [place] - [address]  [address] [address]  Phone: [phone] Fax: [phone]  Best Call Back Number: [phone]  Additional Information: Jennyfer is calling to speak with a nurse in regards to her 's legs and feet swelling.Please call back and advise.

## 2020-05-25 NOTE — PROGRESS NOTES
Subjective:       Patient ID: Figueroa Ann is a 70 y.o. male.    Chief Complaint: Foot Swelling (both and legs)    Mr. Ann is a new patient to me.  He is here for bilateral leg swelling x 1 week.  He is with his sister today at the visit.  He does not communicate all that much.  He denies pain or tenderness in is lower legs.he lives with his sister.  She did elevate his legs at night, but he does not like to keep them elevated he fights at times.  He denies SOB, chest pain or cough.    Vitals:    05/25/20 1430   BP: 116/62   Pulse: (!) 57   Temp: 97.9 °F (36.6 °C)     Review of Systems   Constitutional: Positive for activity change, appetite change and fatigue. Negative for chills and fever.   HENT: Positive for hearing loss. Negative for congestion, ear pain, postnasal drip, rhinorrhea, sinus pressure, sinus pain, sneezing, sore throat and trouble swallowing.    Eyes: Negative for pain, redness and visual disturbance.   Respiratory: Negative for cough, choking, chest tightness, shortness of breath and wheezing.    Cardiovascular: Positive for leg swelling. Negative for chest pain and palpitations.   Gastrointestinal: Negative for abdominal pain, blood in stool, constipation, diarrhea, nausea and vomiting.   Endocrine: Negative.    Genitourinary: Negative for decreased urine volume, difficulty urinating, dysuria, flank pain, hematuria and urgency.   Musculoskeletal: Negative for back pain, gait problem, myalgias and neck pain.   Skin: Negative for color change, pallor and rash.   Allergic/Immunologic: Negative.    Neurological: Negative for dizziness, speech difficulty, weakness, light-headedness, numbness and headaches.   Hematological: Does not bruise/bleed easily.   Psychiatric/Behavioral: Positive for decreased concentration and dysphoric mood. Negative for self-injury and suicidal ideas. The patient is not nervous/anxious.        Past Medical History:   Diagnosis Date    Diabetes mellitus, type 2     DM  (diabetes mellitus)     HLD (hyperlipidemia)     HTN (hypertension)     Stroke        Objective:      Physical Exam   Constitutional: He is oriented to person, place, and time. Vital signs are normal. He appears well-developed and well-nourished. He is cooperative. He is easily aroused. He does not appear ill. No distress.   HENT:   Head: Normocephalic and atraumatic. Head is without right periorbital erythema and without left periorbital erythema.   Right Ear: External ear normal. Decreased hearing is noted.   Left Ear: External ear normal. Decreased hearing is noted.   Nose: Nose normal. No mucosal edema or rhinorrhea. Right sinus exhibits no maxillary sinus tenderness and no frontal sinus tenderness. Left sinus exhibits no maxillary sinus tenderness and no frontal sinus tenderness.   Mouth/Throat: Uvula is midline, oropharynx is clear and moist and mucous membranes are normal.   Eyes: Pupils are equal, round, and reactive to light. Conjunctivae and EOM are normal.   Neck: Trachea normal, normal range of motion and full passive range of motion without pain. Neck supple.   Cardiovascular: Regular rhythm, normal heart sounds, intact distal pulses and normal pulses. Bradycardia present.   Denies sob or chest pain, lung fields clear   Pulmonary/Chest: Effort normal and breath sounds normal. No stridor. No apnea, no tachypnea and no bradypnea. No respiratory distress. He has no decreased breath sounds. He has no wheezes. He has no rhonchi. He has no rales.   All lung fields clear to auscultation.    Abdominal: Soft. Normal appearance and bowel sounds are normal. There is no tenderness.   Musculoskeletal:        Right foot: There is decreased range of motion. There is no deformity.        Left foot: There is decreased range of motion. There is no deformity.   Feet:   Right Foot:   Skin Integrity: Positive for warmth, callus and dry skin. Negative for ulcer, blister, skin breakdown or erythema.   Left Foot:   Skin  Integrity: Positive for warmth, callus and dry skin. Negative for ulcer, blister, skin breakdown or erythema.   Neurological: He is oriented to person, place, and time and easily aroused.   Skin: Skin is warm, dry and intact. Capillary refill takes less than 2 seconds. No lesion, no petechiae and no rash noted. He is not diaphoretic. No erythema.   Bilateral lower leg swelling   Psychiatric: Thought content normal. His affect is blunt. His speech is delayed. He is slowed. Cognition and memory are impaired. He is inattentive.   Nursing note and vitals reviewed.      Assessment:       1. Localized swelling of both lower legs    2. Abnormal EKG        Plan:       Localized swelling of both lower legs  -     IN OFFICE EKG 12-LEAD (to Muse)  -     furosemide (LASIX) 20 MG tablet; Take 1 tablet (20 mg total) by mouth once daily. for 14 days  Dispense: 14 tablet; Refill: 0  -     Ambulatory referral/consult to Cardiology; Future; Expected date: 05/26/2020    Abnormal EKG  -     Ambulatory referral/consult to Cardiology; Future; Expected date: 05/26/2020    Asked sister to call clinic on Friday of this week to report status of patient lower leg swelling and toleration of new medication of lasix.  Communicated changes to sister of patient on EKG, sooner patient for cardiology available, sister/caregier declined and stated she needed a morning appt only.  First available morning appt given to patient.        Start taking lasix in the morning once a day for 14 days, then stop  Elevate legs at night  Reduce salt intake  Increase water  Apply compression socks or hose daily while up moving around.  Continue all other medications  Monitor urine out[ut for clear yellow urine  Meet with cardiology- scheduled appt in June 2020    Follow up in about 4 days (around 5/29/2020) for status of leg swelling.

## 2020-05-25 NOTE — TELEPHONE ENCOUNTER
Called got patient scheduled to see np today states his legs been swollen for a few days now and she was requesting dr. Hudson but explained he is out of the clinic today. She is worried with him having hypertension and diabetes. Call ended.

## 2020-05-28 ENCOUNTER — TELEPHONE (OUTPATIENT)
Dept: FAMILY MEDICINE | Facility: CLINIC | Age: 70
End: 2020-05-28

## 2020-06-08 ENCOUNTER — PATIENT OUTREACH (OUTPATIENT)
Dept: ADMINISTRATIVE | Facility: OTHER | Age: 70
End: 2020-06-08

## 2020-06-08 NOTE — PROGRESS NOTES
Patient's chart was reviewed.   Requested updates within Care Everywhere.  Fit kit previously dispensed  A1C previously ordered.

## 2020-06-10 ENCOUNTER — OFFICE VISIT (OUTPATIENT)
Dept: CARDIOLOGY | Facility: CLINIC | Age: 70
End: 2020-06-10
Payer: MEDICARE

## 2020-06-10 VITALS
WEIGHT: 168.44 LBS | HEIGHT: 67 IN | SYSTOLIC BLOOD PRESSURE: 109 MMHG | HEART RATE: 54 BPM | BODY MASS INDEX: 26.44 KG/M2 | DIASTOLIC BLOOD PRESSURE: 70 MMHG

## 2020-06-10 DIAGNOSIS — R94.31 NONSPECIFIC ABNORMAL ELECTROCARDIOGRAM (ECG) (EKG): ICD-10-CM

## 2020-06-10 DIAGNOSIS — R22.43 LOCALIZED SWELLING OF BOTH LOWER LEGS: ICD-10-CM

## 2020-06-10 DIAGNOSIS — I69.328 CVA, OLD, SPEECH/LANGUAGE DEFICIT: ICD-10-CM

## 2020-06-10 DIAGNOSIS — R94.31 ABNORMAL EKG: ICD-10-CM

## 2020-06-10 DIAGNOSIS — Z79.4 TYPE 2 DIABETES MELLITUS WITHOUT COMPLICATION, WITH LONG-TERM CURRENT USE OF INSULIN: ICD-10-CM

## 2020-06-10 DIAGNOSIS — E11.9 TYPE 2 DIABETES MELLITUS WITHOUT COMPLICATION, WITH LONG-TERM CURRENT USE OF INSULIN: ICD-10-CM

## 2020-06-10 PROBLEM — R60.9 EDEMA: Status: ACTIVE | Noted: 2020-06-10

## 2020-06-10 PROCEDURE — 1159F PR MEDICATION LIST DOCUMENTED IN MEDICAL RECORD: ICD-10-PCS | Mod: S$GLB,,, | Performed by: INTERNAL MEDICINE

## 2020-06-10 PROCEDURE — 99999 PR PBB SHADOW E&M-EST. PATIENT-LVL III: ICD-10-PCS | Mod: PBBFAC,,, | Performed by: INTERNAL MEDICINE

## 2020-06-10 PROCEDURE — 3288F PR FALLS RISK ASSESSMENT DOCUMENTED: ICD-10-PCS | Mod: S$GLB,,, | Performed by: INTERNAL MEDICINE

## 2020-06-10 PROCEDURE — 3074F PR MOST RECENT SYSTOLIC BLOOD PRESSURE < 130 MM HG: ICD-10-PCS | Mod: S$GLB,,, | Performed by: INTERNAL MEDICINE

## 2020-06-10 PROCEDURE — 3078F DIAST BP <80 MM HG: CPT | Mod: S$GLB,,, | Performed by: INTERNAL MEDICINE

## 2020-06-10 PROCEDURE — 3044F PR MOST RECENT HEMOGLOBIN A1C LEVEL <7.0%: ICD-10-PCS | Mod: S$GLB,,, | Performed by: INTERNAL MEDICINE

## 2020-06-10 PROCEDURE — 99999 PR PBB SHADOW E&M-EST. PATIENT-LVL III: CPT | Mod: PBBFAC,,, | Performed by: INTERNAL MEDICINE

## 2020-06-10 PROCEDURE — 3078F PR MOST RECENT DIASTOLIC BLOOD PRESSURE < 80 MM HG: ICD-10-PCS | Mod: S$GLB,,, | Performed by: INTERNAL MEDICINE

## 2020-06-10 PROCEDURE — 3044F HG A1C LEVEL LT 7.0%: CPT | Mod: S$GLB,,, | Performed by: INTERNAL MEDICINE

## 2020-06-10 PROCEDURE — 1100F PTFALLS ASSESS-DOCD GE2>/YR: CPT | Mod: S$GLB,,, | Performed by: INTERNAL MEDICINE

## 2020-06-10 PROCEDURE — 1126F PR PAIN SEVERITY QUANTIFIED, NO PAIN PRESENT: ICD-10-PCS | Mod: S$GLB,,, | Performed by: INTERNAL MEDICINE

## 2020-06-10 PROCEDURE — 99204 PR OFFICE/OUTPT VISIT, NEW, LEVL IV, 45-59 MIN: ICD-10-PCS | Mod: S$GLB,,, | Performed by: INTERNAL MEDICINE

## 2020-06-10 PROCEDURE — 99204 OFFICE O/P NEW MOD 45 MIN: CPT | Mod: S$GLB,,, | Performed by: INTERNAL MEDICINE

## 2020-06-10 PROCEDURE — 1126F AMNT PAIN NOTED NONE PRSNT: CPT | Mod: S$GLB,,, | Performed by: INTERNAL MEDICINE

## 2020-06-10 PROCEDURE — 3288F FALL RISK ASSESSMENT DOCD: CPT | Mod: S$GLB,,, | Performed by: INTERNAL MEDICINE

## 2020-06-10 PROCEDURE — 1159F MED LIST DOCD IN RCRD: CPT | Mod: S$GLB,,, | Performed by: INTERNAL MEDICINE

## 2020-06-10 PROCEDURE — 1100F PR PT FALLS ASSESS DOC 2+ FALLS/FALL W/INJURY/YR: ICD-10-PCS | Mod: S$GLB,,, | Performed by: INTERNAL MEDICINE

## 2020-06-10 PROCEDURE — 3074F SYST BP LT 130 MM HG: CPT | Mod: S$GLB,,, | Performed by: INTERNAL MEDICINE

## 2020-06-10 NOTE — LETTER
Najma 10, 2020      Denisha Alvarez NP  [address] [place] [address] [address] [place] [address]           [facility]  [address] [place] [address]  Phone: [phone]          Patient: Figueroa Ann   MR Number: [id number]   YOB: 1950   Date of Visit: 6/10/2020       Dear Denisha Alvarez:    Thank you for referring Figueroa Ann to me for evaluation. Attached you will find relevant portions of my assessment and plan of care.    If you have questions, please do not hesitate to call me. I look forward to following Figueroa Ann along with you.    Sincerely,    Jose Mcclure MD    Enclosure  CC:  No Recipients    If you would like to receive this communication electronically, please contact [email or web address] or [phone] to request more information on [employer] access.    For providers and/or their staff who would like to refer a patient to [facility], please contact us through our one-stop-shop provider referral line, [facility], at [phone].    If you feel you have received this communication in error or would no longer like to receive these types of communications, please e-mail [email or web address]

## 2020-06-10 NOTE — PROGRESS NOTES
Subjective:    Patient ID:  Figueroa Ann is a 70 y.o. male who presents for evaluation of Abnormal ECG and Shortness of Breath      Pt with previous CVA referred for LE edema. He was given lasix by NP which has helped some. Sister here to talk for Pt as he does not communicate much since stroke. She reports that he has not complained about any chest pains or SOB.       Review of Systems   Constitution: Negative for weight gain and weight loss.   HENT: Negative.    Eyes: Negative.    Cardiovascular: Positive for leg swelling. Negative for chest pain, claudication, cyanosis, dyspnea on exertion, irregular heartbeat, near-syncope, orthopnea (no PND), palpitations and syncope.   Respiratory: Negative for cough, hemoptysis, shortness of breath and snoring.    Endocrine: Negative.    Skin: Negative.    Musculoskeletal: Negative for joint pain, muscle cramps, muscle weakness and myalgias.   Gastrointestinal: Negative for diarrhea, hematemesis, nausea and vomiting.   Genitourinary: Negative.    Neurological: Negative for dizziness, focal weakness, light-headedness, loss of balance, numbness, paresthesias and seizures.   Psychiatric/Behavioral: Negative.         Objective:    Physical Exam   Constitutional: He appears well-developed and well-nourished.   HENT:   Mouth/Throat: Oropharynx is clear and moist.   Eyes: Pupils are equal, round, and reactive to light.   Neck: Normal range of motion. No thyromegaly present.   Cardiovascular: Normal rate, regular rhythm, S1 normal, S2 normal, normal heart sounds, intact distal pulses and normal pulses.  No extrasystoles are present. PMI is not displaced. Exam reveals no friction rub.   No murmur heard.  Pulmonary/Chest: Effort normal and breath sounds normal. He has no wheezes. He has no rales. He exhibits no tenderness.   Abdominal: Soft. Bowel sounds are normal. He exhibits no distension and no mass. There is no tenderness.   Musculoskeletal: Normal range of motion. He exhibits  edema (1-2+; L>R).   Neurological: He is alert.   Skin: Skin is warm and dry.   Vitals reviewed.      Test(s) Reviewed  I have reviewed the following in detail:  [] Stress test   [] Angiography   [] Echocardiogram   [x] Labs   [x] Other:  ECG; Care Everywhere record       Assessment:       1. Localized swelling of both lower legs    2. Abnormal EKG    3. CVA, old, speech/language deficit    4. Nonspecific abnormal electrocardiogram (ECG) (EKG)    5. Type 2 diabetes mellitus without complication, with long-term current use of insulin         Plan:       We discussed his leg swelling  Have no previous ECG's to compare - T wave abn in lead I, aVL  Other than the LE edema he has no other symptoms  Echo  Repeat carotids  If Echo abnormal will consider ischemic

## 2020-06-23 ENCOUNTER — TELEPHONE (OUTPATIENT)
Dept: CARDIOLOGY | Facility: CLINIC | Age: 70
End: 2020-06-23

## 2020-06-23 NOTE — TELEPHONE ENCOUNTER
----- Message from Leda Weiss sent at 6/23/2020  8:03 AM CDT -----  Type: Needs Medical Advice    Who Called:  Wife, Jennyfer Scanlon Call Back Number: [phone]    Additional Information: Patient needs to reschedule stress test and ultrasound scheduled for tomorrow, 6/24.     I could not reschedule the stress test & want back to back. Thank you!

## 2020-06-26 DIAGNOSIS — E11.9 TYPE 2 DIABETES MELLITUS WITHOUT COMPLICATION: ICD-10-CM

## 2020-07-11 DIAGNOSIS — I10 ESSENTIAL HYPERTENSION: Primary | ICD-10-CM

## 2020-07-13 RX ORDER — LISINOPRIL 20 MG/1
TABLET ORAL
Qty: 90 TABLET | Refills: 2 | Status: SHIPPED | OUTPATIENT
Start: 2020-07-13

## 2020-07-13 NOTE — PROGRESS NOTES
Refill Authorization Note    is requesting a refill authorization.    Brief assessment and rationale for refill: APPROVE: prr          Medication Therapy Plan: approve 9 more    Medication reconciliation completed: No                         Comments:      Requested Prescriptions   Signed Prescriptions Disp Refills    lisinopriL (PRINIVIL,ZESTRIL) 20 MG tablet 90 tablet 2     Sig:  TAKE 1 TABLET DAILY FOR BLOOD PRESSURE *THANK YOU*       Cardiovascular:  ACE Inhibitors Passed - 7/11/2020  8:55 AM        Passed - Patient is at least 18 years old        Passed - Last BP in normal range within 360 days.     BP Readings from Last 3 Encounters:   06/10/20 109/70   05/25/20 116/62   02/04/20 128/88              Passed - Office visit in past 12 months or future 90 days.     Recent Outpatient Visits            1 month ago Localized swelling of both lower legs    [facility] Jose Mcclure MD    1 month ago Localized swelling of both lower legs    [facility] Denisha Alvarez NP    3 months ago Incontinence of feces, unspecified fecal incontinence type    [place] [facility] Prakash Garcia MD    5 months ago Routine physical examination    [facility] Joel Hudson MD    1 year ago Uncontrolled type 2 diabetes mellitus without complication, without long-term current use of insulin    [facility] Lit Albarado MD          Future Appointments              In 1 week VASCULAR, [facility], [place]    In 1 week STRESS ECHO, [facility], [place]    In 4 weeks Joel Hudson MD [facility]                Passed - Cr is 1.3 or below and within 360 days     Creatinine   Date Value Ref Range Status   02/04/2020 1.3 0.5 - 1.4 mg/dL Final   10/22/2018 1.3 0.5 - 1.4 mg/dL Final   09/26/2017 1.2 0.5 - 1.4 mg/dL Final              Passed - K in normal range and within 360 days      Potassium   Date Value Ref Range Status   02/04/2020 4.8 3.5 - 5.1 mmol/L Final   10/22/2018 4.0 3.5 - 5.1 mmol/L Final   09/26/2017 4.3 3.5 - 5.1 mmol/L Final              Passed - eGFR within 360 days     eGFR if non    Date Value Ref Range Status   02/04/2020 54.9 (A) >60 mL/min/1.73 m^2 Final     Comment:     Calculation used to obtain the estimated glomerular filtration  rate (eGFR) is the CKD-EPI equation.      10/22/2018 55.3 (A) >60 mL/min/1.73 m^2 Final     Comment:     Calculation used to obtain the estimated glomerular filtration  rate (eGFR) is the CKD-EPI equation.      09/26/2017 >60.0 >60 mL/min/1.73 m^2 Final     Comment:     Calculation used to obtain the estimated glomerular filtration  rate (eGFR) is the CKD-EPI equation. Since race is unknown   in our information system, the eGFR values for   -American and Non--American patients are given   for each creatinine result.       eGFR if    Date Value Ref Range Status   02/04/2020 >60.0 >60 mL/min/1.73 m^2 Final   10/22/2018 >60.0 >60 mL/min/1.73 m^2 Final   09/26/2017 >60.0 >60 mL/min/1.73 m^2 Final                  Appointments  past 12m or future 3m with PCP    Date Provider   Last Visit   2/4/2020 Joel Hudson MD   Next Visit   8/11/2020 Joel Hudson MD   ED visits in past 90 days: 0     Note composed:12:26 PM 07/13/2020

## 2020-07-28 ENCOUNTER — PATIENT OUTREACH (OUTPATIENT)
Dept: ADMINISTRATIVE | Facility: HOSPITAL | Age: 70
End: 2020-07-28

## 2020-07-28 NOTE — LETTER
July 28, 2020    Figueroa Ann  [address] [place] [address]             [facility]  [address]  [facility] [address]  Phone: [phone] [facility] committed to your overall [facility].  To help you get the most out of each of your visits, we will review your information to make sure you are up to date on all of your recommended tests and/or procedures.      Joel Hudson MD  has found that your chart shows you may be due for the following:    Annual Dilated Eye Exam  Shingles Immunization  Colon cancer screening  Diabetic lab testing    If you have had any of the above done at another facility, please bring the records with you or Fax them to [phone] so that your record at [facility] will be complete. If you have not had any of these tests or procedures done recently and would like to complete this testing ,  please call [phone] or send a message through your [facility] portal to your provider's office.     If you have an upcoming scheduled appointment for the above test and/or procedures, please disregard this letter.    If you are currently taking medication, please bring it with you to your appointment for review.      Thank you for letting us care for you,      Marie Olsen, Care Coordinator  [facility]  Phone: [phone]  Fax: [phone]

## 2020-07-28 NOTE — PROGRESS NOTES
Chart review completed 2020.  Care Everywhere updates requested and reviewed.  Immunizations reconciled. Media reports reviewed.  Duplicate HM overrides and  orders removed.  Overdue HM topic chart audit and/or requested.  Overdue lab testing linked to upcoming lab appointments if applies.    Letter mailed    Health Maintenance Due   Topic Date Due    Eye Exam  1958    Shingles Vaccine (1 of 2) 1998    Colorectal Cancer Screening  1998    Abdominal Aortic Aneurysm Screening  2013    Hemoglobin A1c  2020

## 2020-08-11 ENCOUNTER — LAB VISIT (OUTPATIENT)
Dept: LAB | Facility: HOSPITAL | Age: 70
End: 2020-08-11
Attending: INTERNAL MEDICINE
Payer: MEDICARE

## 2020-08-11 ENCOUNTER — OFFICE VISIT (OUTPATIENT)
Dept: FAMILY MEDICINE | Facility: CLINIC | Age: 70
End: 2020-08-11
Payer: MEDICARE

## 2020-08-11 VITALS
OXYGEN SATURATION: 99 % | WEIGHT: 158.5 LBS | HEIGHT: 67 IN | DIASTOLIC BLOOD PRESSURE: 76 MMHG | SYSTOLIC BLOOD PRESSURE: 118 MMHG | BODY MASS INDEX: 24.88 KG/M2 | TEMPERATURE: 98 F | HEART RATE: 80 BPM

## 2020-08-11 DIAGNOSIS — E11.9 CONTROLLED TYPE 2 DIABETES MELLITUS WITHOUT COMPLICATION, WITHOUT LONG-TERM CURRENT USE OF INSULIN: ICD-10-CM

## 2020-08-11 DIAGNOSIS — I10 ESSENTIAL HYPERTENSION: ICD-10-CM

## 2020-08-11 DIAGNOSIS — E11.9 CONTROLLED TYPE 2 DIABETES MELLITUS WITHOUT COMPLICATION, WITHOUT LONG-TERM CURRENT USE OF INSULIN: Primary | ICD-10-CM

## 2020-08-11 DIAGNOSIS — R60.9 EDEMA, UNSPECIFIED TYPE: ICD-10-CM

## 2020-08-11 DIAGNOSIS — R22.43 LOCALIZED SWELLING OF BOTH LOWER LEGS: ICD-10-CM

## 2020-08-11 LAB
ESTIMATED AVG GLUCOSE: 123 MG/DL (ref 68–131)
HBA1C MFR BLD HPLC: 5.9 % (ref 4–5.6)

## 2020-08-11 PROCEDURE — 80053 COMPREHEN METABOLIC PANEL: CPT

## 2020-08-11 PROCEDURE — 1159F MED LIST DOCD IN RCRD: CPT | Mod: S$GLB,,, | Performed by: INTERNAL MEDICINE

## 2020-08-11 PROCEDURE — 3074F PR MOST RECENT SYSTOLIC BLOOD PRESSURE < 130 MM HG: ICD-10-PCS | Mod: S$GLB,,, | Performed by: INTERNAL MEDICINE

## 2020-08-11 PROCEDURE — 1101F PR PT FALLS ASSESS DOC 0-1 FALLS W/OUT INJ PAST YR: ICD-10-PCS | Mod: S$GLB,,, | Performed by: INTERNAL MEDICINE

## 2020-08-11 PROCEDURE — 1101F PT FALLS ASSESS-DOCD LE1/YR: CPT | Mod: S$GLB,,, | Performed by: INTERNAL MEDICINE

## 2020-08-11 PROCEDURE — 99999 PR PBB SHADOW E&M-EST. PATIENT-LVL III: CPT | Mod: PBBFAC,,, | Performed by: INTERNAL MEDICINE

## 2020-08-11 PROCEDURE — 3044F HG A1C LEVEL LT 7.0%: CPT | Mod: S$GLB,,, | Performed by: INTERNAL MEDICINE

## 2020-08-11 PROCEDURE — 99214 PR OFFICE/OUTPT VISIT, EST, LEVL IV, 30-39 MIN: ICD-10-PCS | Mod: S$GLB,,, | Performed by: INTERNAL MEDICINE

## 2020-08-11 PROCEDURE — 83036 HEMOGLOBIN GLYCOSYLATED A1C: CPT

## 2020-08-11 PROCEDURE — 3044F PR MOST RECENT HEMOGLOBIN A1C LEVEL <7.0%: ICD-10-PCS | Mod: S$GLB,,, | Performed by: INTERNAL MEDICINE

## 2020-08-11 PROCEDURE — 3078F PR MOST RECENT DIASTOLIC BLOOD PRESSURE < 80 MM HG: ICD-10-PCS | Mod: S$GLB,,, | Performed by: INTERNAL MEDICINE

## 2020-08-11 PROCEDURE — 99214 OFFICE O/P EST MOD 30 MIN: CPT | Mod: S$GLB,,, | Performed by: INTERNAL MEDICINE

## 2020-08-11 PROCEDURE — 3008F BODY MASS INDEX DOCD: CPT | Mod: S$GLB,,, | Performed by: INTERNAL MEDICINE

## 2020-08-11 PROCEDURE — 99999 PR PBB SHADOW E&M-EST. PATIENT-LVL III: ICD-10-PCS | Mod: PBBFAC,,, | Performed by: INTERNAL MEDICINE

## 2020-08-11 PROCEDURE — 1159F PR MEDICATION LIST DOCUMENTED IN MEDICAL RECORD: ICD-10-PCS | Mod: S$GLB,,, | Performed by: INTERNAL MEDICINE

## 2020-08-11 PROCEDURE — 36415 COLL VENOUS BLD VENIPUNCTURE: CPT | Mod: PO

## 2020-08-11 PROCEDURE — 3078F DIAST BP <80 MM HG: CPT | Mod: S$GLB,,, | Performed by: INTERNAL MEDICINE

## 2020-08-11 PROCEDURE — 3008F PR BODY MASS INDEX (BMI) DOCUMENTED: ICD-10-PCS | Mod: S$GLB,,, | Performed by: INTERNAL MEDICINE

## 2020-08-11 PROCEDURE — 3074F SYST BP LT 130 MM HG: CPT | Mod: S$GLB,,, | Performed by: INTERNAL MEDICINE

## 2020-08-11 RX ORDER — FUROSEMIDE 20 MG/1
20 TABLET ORAL DAILY
Qty: 14 TABLET | Refills: 0 | Status: SHIPPED | OUTPATIENT
Start: 2020-08-11 | End: 2020-08-25

## 2020-08-11 NOTE — PROGRESS NOTES
Subjective:       Patient ID: Figueroa Ann is a 70 y.o. male.    Chief Complaint: Diabetes    Relies on his wife to bring him.  Wife present who assists with HPI/ROS    Complains of fecal incontinence new over past few months.  Occurs about 2x/week.  Wife states he may have a lot of issues with colon prep.  Saw GI but w/u canceled with COVID.  Will reach back out to GI.    Complains of swelling in both legs for about 3 months.  Given short round of lasix and sent to cardiology who is working him up now.      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  On Plavix.   DM - eye Dr in [place] due for eye exam.  Saw about 1 yr ago.  HTN - controlled  HLD - controlled on statin; gaol < 70 CVA    Labs today    Review of Systems   Unable to perform ROS: Patient nonverbal   Constitutional: Negative for appetite change and fever.   HENT: Negative for nosebleeds and trouble swallowing.    Eyes: Negative for discharge and visual disturbance.   Respiratory: Negative for choking and shortness of breath.    Cardiovascular: Negative for chest pain, palpitations and leg swelling.   Gastrointestinal: Negative for abdominal pain, nausea and vomiting.   Musculoskeletal: Negative for arthralgias and joint swelling.   Skin: Negative for rash and wound.   Neurological: Negative for dizziness and syncope.   Psychiatric/Behavioral: Negative for confusion and dysphoric mood.       Objective:      Vitals:    08/11/20 1102   BP: 118/76   Pulse: 80   Temp: 98.2 °F (36.8 °C)     Physical Exam  Vitals signs reviewed.   Eyes:      Conjunctiva/sclera: Conjunctivae normal.   Neck:      Musculoskeletal: Normal range of motion.   Cardiovascular:      Rate and Rhythm: Normal rate and regular rhythm.      Comments: 1+ pitting edema b/l below knees  Pulmonary:      Effort: Pulmonary effort is normal.      Breath sounds: Normal breath sounds.   Musculoskeletal:      Comments: Normal ROM bilateral    Skin:     General: Skin is warm and dry.    Neurological:      Cranial Nerves: No cranial nerve deficit (grossly intact).   Psychiatric:      Comments: Alert and orientated           Assessment:       1. Controlled type 2 diabetes mellitus without complication, without long-term current use of insulin    2. Essential hypertension    3. Edema, unspecified type    4. Localized swelling of both lower legs        Plan:       Controlled type 2 diabetes mellitus without complication, without long-term current use of insulin  -     Hemoglobin A1C; Future; Expected date: 08/11/2020    Essential hypertension  -     Comprehensive metabolic panel; Future; Expected date: 08/11/2020    Edema, unspecified type    Localized swelling of both lower legs  -     furosemide (LASIX) 20 MG tablet; Take 1 tablet (20 mg total) by mouth once daily. for 14 days  Dispense: 14 tablet; Refill: 0            Medication List with Changes/Refills   Current Medications    AMLODIPINE (NORVASC) 10 MG TABLET    TAKE 1 TABLET DAILY FOR BLOOD PRESSURE *THANK YOU*    ATORVASTATIN (LIPITOR) 10 MG TABLET    TAKE 1 TABLET DAILY FOR CHOLESTEROL *THANK YOU*    BLOOD SUGAR DIAGNOSTIC STRP    1 strip by Misc.(Non-Drug; Combo Route) route 3 (three) times daily before meals.    CLOPIDOGREL (PLAVIX) 75 MG TABLET    TAKE 1 TABLET DAILY FOR CIRCULATION *THANK YOU*    HUMULIN N NPH U-100 INSULIN 100 UNIT/ML INJECTION    INJECT 25 UNITS INTO THE SKIN 3 TIMES DAILY WITH SLIDING SCALE HOLD IF LESS THAN 180 *THANK YOU*    INSULIN LISPRO (HUMALOG U-100 INSULIN) 100 UNIT/ML INJECTION    Inject into the skin.    LANCETS MISC    1 lancet by Misc.(Non-Drug; Combo Route) route 3 (three) times daily before meals.    LISINOPRIL (PRINIVIL,ZESTRIL) 20 MG TABLET     TAKE 1 TABLET DAILY FOR BLOOD PRESSURE *THANK YOU*   Changed and/or Refilled Medications    Modified Medication Previous Medication    FUROSEMIDE (LASIX) 20 MG TABLET furosemide (LASIX) 20 MG tablet       Take 1 tablet (20 mg total) by mouth once daily. for 14  days    Take 1 tablet (20 mg total) by mouth once daily. for 14 days     Compression stockings + 14 day lasix.  Discuss continuing or just using stocking when see cardiology on 8/26  Continue current management and monitor.    Counseled on regular exercise, maintenance of a healthy weight, balanced diet rich in fruits/vegetables and lean protein, and avoidance of unhealthy habits like smoking and excessive alcohol intake.   Also, counseled on importance of being compliant with medication, health appointments, diet and exercise.     Follow up in about 6 months (around 2/11/2021). cl

## 2020-08-12 LAB
ALBUMIN SERPL BCP-MCNC: 4 G/DL (ref 3.5–5.2)
ALP SERPL-CCNC: 60 U/L (ref 55–135)
ALT SERPL W/O P-5'-P-CCNC: 17 U/L (ref 10–44)
ANION GAP SERPL CALC-SCNC: 10 MMOL/L (ref 8–16)
AST SERPL-CCNC: 31 U/L (ref 10–40)
BILIRUB SERPL-MCNC: 1.8 MG/DL (ref 0.1–1)
BUN SERPL-MCNC: 16 MG/DL (ref 8–23)
CALCIUM SERPL-MCNC: 9.8 MG/DL (ref 8.7–10.5)
CHLORIDE SERPL-SCNC: 112 MMOL/L (ref 95–110)
CO2 SERPL-SCNC: 25 MMOL/L (ref 23–29)
CREAT SERPL-MCNC: 1.3 MG/DL (ref 0.5–1.4)
EST. GFR  (AFRICAN AMERICAN): >60 ML/MIN/1.73 M^2
EST. GFR  (NON AFRICAN AMERICAN): 54.5 ML/MIN/1.73 M^2
GLUCOSE SERPL-MCNC: 70 MG/DL (ref 70–110)
POTASSIUM SERPL-SCNC: 4.7 MMOL/L (ref 3.5–5.1)
PROT SERPL-MCNC: 7.7 G/DL (ref 6–8.4)
SODIUM SERPL-SCNC: 147 MMOL/L (ref 136–145)

## 2020-10-22 ENCOUNTER — TELEPHONE (OUTPATIENT)
Dept: CARDIOLOGY | Facility: CLINIC | Age: 70
End: 2020-10-22

## 2020-10-22 NOTE — TELEPHONE ENCOUNTER
----- Message from Ana Piedra sent at 10/22/2020 10:52 AM CDT -----  Regarding: advice  Contact: Wife/Jennyfer/[phone] (home)  Type: Needs Medical Advice  Who Called:  Wife/Jennyfer/[phone] (home)       Additional Information: Needs to talk to the office concerning the patient. Please call back for the details. Thanks.

## 2020-10-26 ENCOUNTER — PATIENT OUTREACH (OUTPATIENT)
Dept: ADMINISTRATIVE | Facility: HOSPITAL | Age: 70
End: 2020-10-26

## 2020-10-26 NOTE — LETTER
October 26, 2020    Figueroa Ann  [address] [place] [address]             [facility]  [address]  [facility] [address]  Phone: [phone] [facility] committed to your overall [facility].  To help you get the most out of each of your visits, we will review your information to make sure you are up to date on all of your recommended tests and/or procedures.      Joel Hudson MD  has found that your chart shows you may be due for the following:    Annual Dilated Eye Exam  Influenza Vaccine  Shingles Immunization    If you have had any of the above done at another facility, please bring the records with you or Fax them to [phone] so that your record at [facility] will be complete. If you have not had any of these tests or procedures done recently and would like to complete this testing ,  please call [phone] or send a message through your [facility] portal to your provider's office.     If you have an upcoming scheduled appointment for the above test and/or procedures, please disregard this letter.    If you are currently taking medication, please bring it with you to your appointment for review.      Thank you for letting us care for you,    Marie Olsen, Care Coordinator  [facility]  Phone: [phone]  Fax: [phone]

## 2020-10-26 NOTE — PROGRESS NOTES
10/26/2020 Care Everywhere updates requested and reviewed.  Immunizations reconciled. Media reports reviewed.  Duplicate HM overrides and  orders removed.  Overdue HM topic chart audit and/or requested.  Overdue lab testing linked to upcoming lab appointments if applies.    LINKS DOWN 10/26/2020   LETTER MAILED      Health Maintenance Due   Topic Date Due    Eye Exam  1960    Shingles Vaccine (1 of 2) 1998    Abdominal Aortic Aneurysm Screening  2013    Influenza Vaccine (1) 2020

## 2020-11-09 ENCOUNTER — TELEPHONE (OUTPATIENT)
Dept: FAMILY MEDICINE | Facility: CLINIC | Age: 70
End: 2020-11-09

## 2020-11-20 ENCOUNTER — PES CALL (OUTPATIENT)
Dept: ADMINISTRATIVE | Facility: CLINIC | Age: 70
End: 2020-11-20

## 2020-12-07 ENCOUNTER — TELEPHONE (OUTPATIENT)
Dept: FAMILY MEDICINE | Facility: CLINIC | Age: 70
End: 2020-12-07

## 2020-12-07 ENCOUNTER — PATIENT OUTREACH (OUTPATIENT)
Dept: ADMINISTRATIVE | Facility: HOSPITAL | Age: 70
End: 2020-12-07

## 2020-12-08 ENCOUNTER — TELEPHONE (OUTPATIENT)
Dept: FAMILY MEDICINE | Facility: CLINIC | Age: 70
End: 2020-12-08

## 2021-04-27 ENCOUNTER — PATIENT OUTREACH (OUTPATIENT)
Dept: ADMINISTRATIVE | Facility: HOSPITAL | Age: 71
End: 2021-04-27

## 2021-05-12 DIAGNOSIS — E11.9 TYPE 2 DIABETES MELLITUS WITHOUT COMPLICATION: ICD-10-CM

## 2022-07-27 DIAGNOSIS — Z12.11 COLON CANCER SCREENING: ICD-10-CM
